# Patient Record
Sex: MALE | URBAN - METROPOLITAN AREA
[De-identification: names, ages, dates, MRNs, and addresses within clinical notes are randomized per-mention and may not be internally consistent; named-entity substitution may affect disease eponyms.]

---

## 2019-09-07 ENCOUNTER — EMERGENCY (EMERGENCY)
Facility: HOSPITAL | Age: 44
LOS: 1 days | Discharge: HOME | End: 2019-09-07
Attending: EMERGENCY MEDICINE | Admitting: EMERGENCY MEDICINE
Payer: COMMERCIAL

## 2019-09-07 VITALS
SYSTOLIC BLOOD PRESSURE: 117 MMHG | OXYGEN SATURATION: 100 % | RESPIRATION RATE: 16 BRPM | TEMPERATURE: 98 F | DIASTOLIC BLOOD PRESSURE: 73 MMHG | HEART RATE: 67 BPM

## 2019-09-07 VITALS
DIASTOLIC BLOOD PRESSURE: 89 MMHG | OXYGEN SATURATION: 100 % | HEART RATE: 91 BPM | TEMPERATURE: 98 F | RESPIRATION RATE: 16 BRPM | SYSTOLIC BLOOD PRESSURE: 130 MMHG

## 2019-09-07 LAB
ALBUMIN SERPL ELPH-MCNC: 4.8 G/DL — SIGNIFICANT CHANGE UP (ref 3.3–5)
ALP SERPL-CCNC: 51 U/L — SIGNIFICANT CHANGE UP (ref 40–120)
ALT FLD-CCNC: 21 U/L — SIGNIFICANT CHANGE UP (ref 4–41)
ANION GAP SERPL CALC-SCNC: 15 MMO/L — HIGH (ref 7–14)
AST SERPL-CCNC: 24 U/L — SIGNIFICANT CHANGE UP (ref 4–40)
BASOPHILS # BLD AUTO: 0.08 K/UL — SIGNIFICANT CHANGE UP (ref 0–0.2)
BASOPHILS NFR BLD AUTO: 0.4 % — SIGNIFICANT CHANGE UP (ref 0–2)
BILIRUB SERPL-MCNC: 0.5 MG/DL — SIGNIFICANT CHANGE UP (ref 0.2–1.2)
BUN SERPL-MCNC: 9 MG/DL — SIGNIFICANT CHANGE UP (ref 7–23)
CALCIUM SERPL-MCNC: 9.5 MG/DL — SIGNIFICANT CHANGE UP (ref 8.4–10.5)
CHLORIDE SERPL-SCNC: 101 MMOL/L — SIGNIFICANT CHANGE UP (ref 98–107)
CO2 SERPL-SCNC: 27 MMOL/L — SIGNIFICANT CHANGE UP (ref 22–31)
CREAT SERPL-MCNC: 0.8 MG/DL — SIGNIFICANT CHANGE UP (ref 0.5–1.3)
EOSINOPHIL # BLD AUTO: 0.02 K/UL — SIGNIFICANT CHANGE UP (ref 0–0.5)
EOSINOPHIL NFR BLD AUTO: 0.1 % — SIGNIFICANT CHANGE UP (ref 0–6)
GLUCOSE SERPL-MCNC: 106 MG/DL — HIGH (ref 70–99)
HCT VFR BLD CALC: 45.5 % — SIGNIFICANT CHANGE UP (ref 39–50)
HGB BLD-MCNC: 15.3 G/DL — SIGNIFICANT CHANGE UP (ref 13–17)
IMM GRANULOCYTES NFR BLD AUTO: 0.5 % — SIGNIFICANT CHANGE UP (ref 0–1.5)
LYMPHOCYTES # BLD AUTO: 1.49 K/UL — SIGNIFICANT CHANGE UP (ref 1–3.3)
LYMPHOCYTES # BLD AUTO: 7.8 % — LOW (ref 13–44)
MCHC RBC-ENTMCNC: 30.8 PG — SIGNIFICANT CHANGE UP (ref 27–34)
MCHC RBC-ENTMCNC: 33.6 % — SIGNIFICANT CHANGE UP (ref 32–36)
MCV RBC AUTO: 91.5 FL — SIGNIFICANT CHANGE UP (ref 80–100)
MONOCYTES # BLD AUTO: 0.94 K/UL — HIGH (ref 0–0.9)
MONOCYTES NFR BLD AUTO: 4.9 % — SIGNIFICANT CHANGE UP (ref 2–14)
NEUTROPHILS # BLD AUTO: 16.5 K/UL — HIGH (ref 1.8–7.4)
NEUTROPHILS NFR BLD AUTO: 86.3 % — HIGH (ref 43–77)
NRBC # FLD: 0 K/UL — SIGNIFICANT CHANGE UP (ref 0–0)
PLATELET # BLD AUTO: 200 K/UL — SIGNIFICANT CHANGE UP (ref 150–400)
PMV BLD: 11.3 FL — SIGNIFICANT CHANGE UP (ref 7–13)
POTASSIUM SERPL-MCNC: 4 MMOL/L — SIGNIFICANT CHANGE UP (ref 3.5–5.3)
POTASSIUM SERPL-SCNC: 4 MMOL/L — SIGNIFICANT CHANGE UP (ref 3.5–5.3)
PROT SERPL-MCNC: 7.3 G/DL — SIGNIFICANT CHANGE UP (ref 6–8.3)
RBC # BLD: 4.97 M/UL — SIGNIFICANT CHANGE UP (ref 4.2–5.8)
RBC # FLD: 13.2 % — SIGNIFICANT CHANGE UP (ref 10.3–14.5)
SODIUM SERPL-SCNC: 143 MMOL/L — SIGNIFICANT CHANGE UP (ref 135–145)
WBC # BLD: 19.12 K/UL — HIGH (ref 3.8–10.5)
WBC # FLD AUTO: 19.12 K/UL — HIGH (ref 3.8–10.5)

## 2019-09-07 PROCEDURE — 23650 CLTX SHO DSLC W/MNPJ WO ANES: CPT | Mod: RT

## 2019-09-07 PROCEDURE — 73030 X-RAY EXAM OF SHOULDER: CPT | Mod: 26,RT

## 2019-09-07 PROCEDURE — 99285 EMERGENCY DEPT VISIT HI MDM: CPT | Mod: 25

## 2019-09-07 RX ORDER — DIAZEPAM 5 MG
5 TABLET ORAL ONCE
Refills: 0 | Status: DISCONTINUED | OUTPATIENT
Start: 2019-09-07 | End: 2019-09-07

## 2019-09-07 RX ORDER — PROPOFOL 10 MG/ML
100 INJECTION, EMULSION INTRAVENOUS ONCE
Refills: 0 | Status: COMPLETED | OUTPATIENT
Start: 2019-09-07 | End: 2019-09-07

## 2019-09-07 RX ORDER — ACETAMINOPHEN 500 MG
975 TABLET ORAL ONCE
Refills: 0 | Status: COMPLETED | OUTPATIENT
Start: 2019-09-07 | End: 2019-09-07

## 2019-09-07 RX ORDER — IBUPROFEN 200 MG
400 TABLET ORAL ONCE
Refills: 0 | Status: COMPLETED | OUTPATIENT
Start: 2019-09-07 | End: 2019-09-07

## 2019-09-07 RX ORDER — HYDROMORPHONE HYDROCHLORIDE 2 MG/ML
0.5 INJECTION INTRAMUSCULAR; INTRAVENOUS; SUBCUTANEOUS ONCE
Refills: 0 | Status: DISCONTINUED | OUTPATIENT
Start: 2019-09-07 | End: 2019-09-07

## 2019-09-07 RX ORDER — MORPHINE SULFATE 50 MG/1
4 CAPSULE, EXTENDED RELEASE ORAL ONCE
Refills: 0 | Status: DISCONTINUED | OUTPATIENT
Start: 2019-09-07 | End: 2019-09-07

## 2019-09-07 RX ORDER — TETANUS TOXOID, REDUCED DIPHTHERIA TOXOID AND ACELLULAR PERTUSSIS VACCINE, ADSORBED 5; 2.5; 8; 8; 2.5 [IU]/.5ML; [IU]/.5ML; UG/.5ML; UG/.5ML; UG/.5ML
0.5 SUSPENSION INTRAMUSCULAR ONCE
Refills: 0 | Status: COMPLETED | OUTPATIENT
Start: 2019-09-07 | End: 2019-09-07

## 2019-09-07 RX ORDER — LIDOCAINE HCL 20 MG/ML
10 VIAL (ML) INJECTION ONCE
Refills: 0 | Status: COMPLETED | OUTPATIENT
Start: 2019-09-07 | End: 2019-09-07

## 2019-09-07 RX ADMIN — Medication 975 MILLIGRAM(S): at 03:06

## 2019-09-07 RX ADMIN — Medication 10 MILLILITER(S): at 03:44

## 2019-09-07 RX ADMIN — Medication 400 MILLIGRAM(S): at 03:06

## 2019-09-07 RX ADMIN — Medication 1 TABLET(S): at 04:18

## 2019-09-07 RX ADMIN — HYDROMORPHONE HYDROCHLORIDE 0.5 MILLIGRAM(S): 2 INJECTION INTRAMUSCULAR; INTRAVENOUS; SUBCUTANEOUS at 03:42

## 2019-09-07 RX ADMIN — PROPOFOL 100 MILLIGRAM(S): 10 INJECTION, EMULSION INTRAVENOUS at 05:05

## 2019-09-07 RX ADMIN — MORPHINE SULFATE 4 MILLIGRAM(S): 50 CAPSULE, EXTENDED RELEASE ORAL at 04:52

## 2019-09-07 RX ADMIN — TETANUS TOXOID, REDUCED DIPHTHERIA TOXOID AND ACELLULAR PERTUSSIS VACCINE, ADSORBED 0.5 MILLILITER(S): 5; 2.5; 8; 8; 2.5 SUSPENSION INTRAMUSCULAR at 03:06

## 2019-09-07 RX ADMIN — Medication 5 MILLIGRAM(S): at 04:18

## 2019-09-07 NOTE — ED PROVIDER NOTE - PHYSICAL EXAMINATION
NCAT EOMI PERRL  Neck supple, no midline tenderness  R shoulder deformity, distal RUE neurovascularly intact in m/u/r distribution, normal radial pulse, normal cap refill

## 2019-09-07 NOTE — ED ADULT NURSE REASSESSMENT NOTE - NS ED NURSE REASSESS COMMENT FT1
Float RN: Pt s/p conscious sedation for right shoulder reduction, pt awake, alert, ambulatory with steady gait at this time. Pt tolerate PO intake, drinking water with ease. Pt in NAD. Resps even/unlabored b/l. Pt denies any pain, states "the shoulder is just sore." Wife at bedside. Primary RN Trupti aware. Gina RN: Pt s/p conscious sedation for right shoulder reduction, see conscious sedation flowsheet. Pt awake, alert, ambulatory with steady gait at this time. Pt tolerate PO intake, drinking water with ease. Pt in NAD. Resps even/unlabored b/l. Pt denies any pain, states "the shoulder is just sore." Wife at bedside. Primary RN Trupti aware.
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
## 2019-09-07 NOTE — ED PROVIDER NOTE - PROGRESS NOTE DETAILS
Lb VILLAVICENCIO: Pt is feeling better, shoulder reduced.  Pt is awake and alert post-conscious sedation prototcol.  He is ambulatory with steady gait, tolerating PO.  Wife is at bedside, stable for dc home.
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
## 2019-09-07 NOTE — ED PROVIDER NOTE - NSFOLLOWUPCLINICS_GEN_ALL_ED_FT
[facility]  .  [place]   Phone: [phone]  Fax:   Follow Up Time:
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
## 2019-09-07 NOTE — ED PROVIDER NOTE - PATIENT PORTAL LINK FT
You can access the FollowMyHealth Patient Portal offered by [facility] by registering at the following website: [email or web address]. By joining [employer]’s FollowMyHealth portal, you will also be able to view your health information using other applications (apps) compatible with our system.
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
## 2019-09-08 NOTE — ED POST DISCHARGE NOTE - REASON FOR FOLLOW-UP
Other Patient contact # [phone] and  both lines " mailbox full and cannot accept Msg's". Called to see how patient feeling post procedural sedation but cannot leave Msg on both phone #'s listed.

## 2023-03-21 NOTE — ED PROVIDER NOTE - CROS ED ROS STATEMENT
I will STOP taking the medications listed below when I get home from the hospital:  None all other ROS negative except as per HPI

## 2024-09-11 ENCOUNTER — INPATIENT (INPATIENT)
Facility: HOSPITAL | Age: 49
LOS: 5 days | Discharge: HOME | End: 2024-09-17
Attending: GENERAL ACUTE CARE HOSPITAL | Admitting: GENERAL ACUTE CARE HOSPITAL
Payer: COMMERCIAL

## 2024-09-11 VITALS
OXYGEN SATURATION: 99 % | TEMPERATURE: 98 F | DIASTOLIC BLOOD PRESSURE: 72 MMHG | HEART RATE: 77 BPM | SYSTOLIC BLOOD PRESSURE: 146 MMHG | WEIGHT: 220.02 LBS | RESPIRATION RATE: 20 BRPM | HEIGHT: 72 IN

## 2024-09-11 LAB
ADD ON TEST-SPECIMEN IN LAB: SIGNIFICANT CHANGE UP
ALBUMIN SERPL ELPH-MCNC: 5.1 G/DL — HIGH (ref 3.3–5)
ALP SERPL-CCNC: 82 U/L — SIGNIFICANT CHANGE UP (ref 40–120)
ALT FLD-CCNC: 21 U/L — SIGNIFICANT CHANGE UP (ref 4–41)
ANION GAP SERPL CALC-SCNC: 21 MMOL/L — HIGH (ref 7–14)
APTT BLD: 32.6 SEC — SIGNIFICANT CHANGE UP (ref 24.5–35.6)
AST SERPL-CCNC: 21 U/L — SIGNIFICANT CHANGE UP (ref 4–40)
BASOPHILS # BLD AUTO: 0.05 K/UL — SIGNIFICANT CHANGE UP (ref 0–0.2)
BASOPHILS NFR BLD AUTO: 0.3 % — SIGNIFICANT CHANGE UP (ref 0–2)
BILIRUB SERPL-MCNC: 1 MG/DL — SIGNIFICANT CHANGE UP (ref 0.2–1.2)
BUN SERPL-MCNC: 8 MG/DL — SIGNIFICANT CHANGE UP (ref 7–23)
CALCIUM SERPL-MCNC: 10.1 MG/DL — SIGNIFICANT CHANGE UP (ref 8.4–10.5)
CHLORIDE SERPL-SCNC: 102 MMOL/L — SIGNIFICANT CHANGE UP (ref 98–107)
CHOLEST SERPL-MCNC: 216 MG/DL — HIGH
CO2 SERPL-SCNC: 18 MMOL/L — LOW (ref 22–31)
CREAT SERPL-MCNC: 0.9 MG/DL — SIGNIFICANT CHANGE UP (ref 0.5–1.3)
EGFR: 105 ML/MIN/1.73M2 — SIGNIFICANT CHANGE UP
EOSINOPHIL # BLD AUTO: 0.05 K/UL — SIGNIFICANT CHANGE UP (ref 0–0.5)
EOSINOPHIL NFR BLD AUTO: 0.3 % — SIGNIFICANT CHANGE UP (ref 0–6)
GLUCOSE SERPL-MCNC: 96 MG/DL — SIGNIFICANT CHANGE UP (ref 70–99)
HCT VFR BLD CALC: 52.8 % — HIGH (ref 39–50)
HDLC SERPL-MCNC: 49 MG/DL — SIGNIFICANT CHANGE UP
HGB BLD-MCNC: 18.3 G/DL — HIGH (ref 13–17)
IANC: 16.85 K/UL — HIGH (ref 1.8–7.4)
IMM GRANULOCYTES NFR BLD AUTO: 0.5 % — SIGNIFICANT CHANGE UP (ref 0–0.9)
INR BLD: 1.06 RATIO — SIGNIFICANT CHANGE UP (ref 0.85–1.18)
LIDOCAIN IGE QN: >3000 U/L — HIGH (ref 7–60)
LIPID PNL WITH DIRECT LDL SERPL: 151 MG/DL — HIGH
LYMPHOCYTES # BLD AUTO: 1.54 K/UL — SIGNIFICANT CHANGE UP (ref 1–3.3)
LYMPHOCYTES # BLD AUTO: 7.8 % — LOW (ref 13–44)
MCHC RBC-ENTMCNC: 31.3 PG — SIGNIFICANT CHANGE UP (ref 27–34)
MCHC RBC-ENTMCNC: 34.7 GM/DL — SIGNIFICANT CHANGE UP (ref 32–36)
MCV RBC AUTO: 90.4 FL — SIGNIFICANT CHANGE UP (ref 80–100)
MONOCYTES # BLD AUTO: 1.09 K/UL — HIGH (ref 0–0.9)
MONOCYTES NFR BLD AUTO: 5.5 % — SIGNIFICANT CHANGE UP (ref 2–14)
NEUTROPHILS # BLD AUTO: 16.85 K/UL — HIGH (ref 1.8–7.4)
NEUTROPHILS NFR BLD AUTO: 85.6 % — HIGH (ref 43–77)
NON HDL CHOLESTEROL: 167 MG/DL — HIGH
NRBC # BLD: 0 /100 WBCS — SIGNIFICANT CHANGE UP (ref 0–0)
NRBC # FLD: 0 K/UL — SIGNIFICANT CHANGE UP (ref 0–0)
PLATELET # BLD AUTO: 274 K/UL — SIGNIFICANT CHANGE UP (ref 150–400)
POTASSIUM SERPL-MCNC: 4 MMOL/L — SIGNIFICANT CHANGE UP (ref 3.5–5.3)
POTASSIUM SERPL-SCNC: 4 MMOL/L — SIGNIFICANT CHANGE UP (ref 3.5–5.3)
PROT SERPL-MCNC: 8.5 G/DL — HIGH (ref 6–8.3)
PROTHROM AB SERPL-ACNC: 11.8 SEC — SIGNIFICANT CHANGE UP (ref 9.5–13)
RBC # BLD: 5.84 M/UL — HIGH (ref 4.2–5.8)
RBC # FLD: 13 % — SIGNIFICANT CHANGE UP (ref 10.3–14.5)
SODIUM SERPL-SCNC: 141 MMOL/L — SIGNIFICANT CHANGE UP (ref 135–145)
TRIGL SERPL-MCNC: 81 MG/DL — SIGNIFICANT CHANGE UP
TROPONIN T, HIGH SENSITIVITY RESULT: <6 NG/L — SIGNIFICANT CHANGE UP
WBC # BLD: 19.68 K/UL — HIGH (ref 3.8–10.5)
WBC # FLD AUTO: 19.68 K/UL — HIGH (ref 3.8–10.5)

## 2024-09-11 PROCEDURE — 74174 CTA ABD&PLVS W/CONTRAST: CPT | Mod: 26,MC

## 2024-09-11 PROCEDURE — 99285 EMERGENCY DEPT VISIT HI MDM: CPT

## 2024-09-11 PROCEDURE — 71275 CT ANGIOGRAPHY CHEST: CPT | Mod: 26,MC

## 2024-09-11 RX ORDER — FAMOTIDINE 10 MG/ML
20 INJECTION INTRAVENOUS ONCE
Refills: 0 | Status: COMPLETED | OUTPATIENT
Start: 2024-09-11 | End: 2024-09-11

## 2024-09-11 RX ORDER — ONDANSETRON 2 MG/ML
4 INJECTION, SOLUTION INTRAMUSCULAR; INTRAVENOUS ONCE
Refills: 0 | Status: COMPLETED | OUTPATIENT
Start: 2024-09-11 | End: 2024-09-11

## 2024-09-11 RX ORDER — PIPERACILLIN SODIUM AND TAZOBACTAM SODIUM 3; .375 G/15ML; G/15ML
3.38 INJECTION, POWDER, FOR SOLUTION INTRAVENOUS ONCE
Refills: 0 | Status: COMPLETED | OUTPATIENT
Start: 2024-09-11 | End: 2024-09-11

## 2024-09-11 RX ORDER — SODIUM CHLORIDE 9 MG/ML
1000 INJECTION INTRAMUSCULAR; INTRAVENOUS; SUBCUTANEOUS ONCE
Refills: 0 | Status: COMPLETED | OUTPATIENT
Start: 2024-09-11 | End: 2024-09-11

## 2024-09-11 RX ADMIN — ONDANSETRON 4 MILLIGRAM(S): 2 INJECTION, SOLUTION INTRAMUSCULAR; INTRAVENOUS at 20:41

## 2024-09-11 RX ADMIN — PIPERACILLIN SODIUM AND TAZOBACTAM SODIUM 200 GRAM(S): 3; .375 INJECTION, POWDER, FOR SOLUTION INTRAVENOUS at 23:49

## 2024-09-11 RX ADMIN — FAMOTIDINE 20 MILLIGRAM(S): 10 INJECTION INTRAVENOUS at 21:36

## 2024-09-11 RX ADMIN — Medication 4 MILLIGRAM(S): at 20:26

## 2024-09-11 RX ADMIN — SODIUM CHLORIDE 1000 MILLILITER(S): 9 INJECTION INTRAMUSCULAR; INTRAVENOUS; SUBCUTANEOUS at 20:42

## 2024-09-11 RX ADMIN — Medication 4 MILLIGRAM(S): at 22:29

## 2024-09-11 NOTE — ED PROVIDER NOTE - PHYSICAL EXAMINATION
Physical exam    Toxic appearing, extremely diaphoretic  Normotensive, pulses equal bilaterally  Clear to auscultation bilaterally  S1-S2 no murmurs rubs or gallops  Abdomen midepigastric pain, no rebound, no guarding   to upper back  Neuro cranial nerves intact, 5/5 motor upper and lower extremity, extremities no edema

## 2024-09-11 NOTE — ED PROVIDER NOTE - OBJECTIVE STATEMENT
49-year-old male with history of gastritis, hypertension presents with chest pain/abdominal pain/back pain that started abruptly at 3 AM last night.  Patient works as a  states was seated in his car when he felt this pain.  Patient actively vomiting in room.  Patient extremely diaphoretic.  Patient states does feel mild chest pain and shortness of breath.  Denies any neurological symptoms.  States does have a history of gastritis and has had possibly similar episodes in the past which are nowhere close to do severity and longevity of the pain today.  Patient states last meal was last night.  Denies any history of gallbladder etiology.

## 2024-09-11 NOTE — ED ADULT NURSE REASSESSMENT NOTE - NS ED NURSE REASSESS COMMENT FT1
Pt received to SUZY, GENE&Ox4, ambulatory. Respirations even and unlabored. NSR on cardiac monitor. Pt endorsing back pain and nausea. Pt denies c/p, SOB, n/v/d, abd pain, blurry vision, headache, or fever like symptoms. Comfort measures provided, safety maintained. Plan of care ongoing.

## 2024-09-11 NOTE — ED ADULT NURSE NOTE - NSFALLUNIVINTERV_ED_ALL_ED
Bed/Stretcher in lowest position, wheels locked, appropriate side rails in place/Call bell, personal items and telephone in reach/Instruct patient to call for assistance before getting out of bed/chair/stretcher/Non-slip footwear applied when patient is off stretcher/[place] to call system/Physically safe environment - no spills, clutter or unnecessary equipment/Purposeful proactive rounding/Room/bathroom lighting operational, light cord in reach

## 2024-09-11 NOTE — ED ADULT NURSE NOTE - OBJECTIVE STATEMENT
Pt arrives to intake room 16 A&ox4, ambulatory, on room air coming to ED c/o abdominal pain and chest pain. Pt history of HTN, gastritis. Pt states pain began at 3 AM 9/11. Pt diaphoretic, endorsing SOB and chest pain at this time. O2 100% on room air, respirations even and unlabored, chest rise and fall equal b/l. MD Meza at bedside. B/l 20g placed, labs collected and sent. Pt transferred to [facility] ED, report given to SHRUTHI Saldana. Pt placed on cardiac monitor, NSR. Pt pending CT. Pt safety maintained.

## 2024-09-11 NOTE — ED ADULT TRIAGE NOTE - CHIEF COMPLAINT QUOTE
patient states" I have severe abdominal pain and chest tightness and back pain since 3 am " h/o acid reflux, HTN, Anxiety, takes Lexapro, Amlodipine. c/o nausea , vomiting c/o severe pain with abdominal distention and chest tightness.

## 2024-09-11 NOTE — ED PROVIDER NOTE - CLINICAL SUMMARY MEDICAL DECISION MAKING FREE TEXT BOX
EKG nonspecific ST changes unchanged from prior  Impression  Differential includes gastritis versus biliary pathology versus aortic dissection versus kidney stone  Given patient's appearance at present will upgraded to main emergency room, have endorsed to Dr. Thomas  Will send labs including troponin, UA start patient on fluid and morphine put in for CTA chest abdomen pelvis to rule out dissection

## 2024-09-11 NOTE — ED PROVIDER NOTE - PROGRESS NOTE DETAILS
Erendira Lugo, PGY-3: pt with pancreatitis. no stones noted on CT.   Patient follows w Dr. Stewart. TBA Dr. Mccauley

## 2024-09-12 DIAGNOSIS — I10 ESSENTIAL (PRIMARY) HYPERTENSION: ICD-10-CM

## 2024-09-12 DIAGNOSIS — K29.70 GASTRITIS, UNSPECIFIED, WITHOUT BLEEDING: ICD-10-CM

## 2024-09-12 DIAGNOSIS — K85.90 ACUTE PANCREATITIS WITHOUT NECROSIS OR INFECTION, UNSPECIFIED: ICD-10-CM

## 2024-09-12 DIAGNOSIS — Z29.9 ENCOUNTER FOR PROPHYLACTIC MEASURES, UNSPECIFIED: ICD-10-CM

## 2024-09-12 LAB
ALBUMIN SERPL ELPH-MCNC: 3.9 G/DL — SIGNIFICANT CHANGE UP (ref 3.3–5)
ALP SERPL-CCNC: 68 U/L — SIGNIFICANT CHANGE UP (ref 40–120)
ALT FLD-CCNC: 30 U/L — SIGNIFICANT CHANGE UP (ref 4–41)
ANION GAP SERPL CALC-SCNC: 14 MMOL/L — SIGNIFICANT CHANGE UP (ref 7–14)
AST SERPL-CCNC: 17 U/L — SIGNIFICANT CHANGE UP (ref 4–40)
BILIRUB DIRECT SERPL-MCNC: <0.2 MG/DL — SIGNIFICANT CHANGE UP (ref 0–0.3)
BILIRUB INDIRECT FLD-MCNC: >0.7 MG/DL — SIGNIFICANT CHANGE UP (ref 0–1)
BILIRUB SERPL-MCNC: 0.9 MG/DL — SIGNIFICANT CHANGE UP (ref 0.2–1.2)
BUN SERPL-MCNC: 7 MG/DL — SIGNIFICANT CHANGE UP (ref 7–23)
CALCIUM SERPL-MCNC: 8.6 MG/DL — SIGNIFICANT CHANGE UP (ref 8.4–10.5)
CHLORIDE SERPL-SCNC: 102 MMOL/L — SIGNIFICANT CHANGE UP (ref 98–107)
CO2 SERPL-SCNC: 20 MMOL/L — LOW (ref 22–31)
CREAT SERPL-MCNC: 0.73 MG/DL — SIGNIFICANT CHANGE UP (ref 0.5–1.3)
EGFR: 112 ML/MIN/1.73M2 — SIGNIFICANT CHANGE UP
GLUCOSE SERPL-MCNC: 91 MG/DL — SIGNIFICANT CHANGE UP (ref 70–99)
HCT VFR BLD CALC: 39.8 % — SIGNIFICANT CHANGE UP (ref 39–50)
HGB BLD-MCNC: 13.2 G/DL — SIGNIFICANT CHANGE UP (ref 13–17)
MAGNESIUM SERPL-MCNC: 1.9 MG/DL — SIGNIFICANT CHANGE UP (ref 1.6–2.6)
MCHC RBC-ENTMCNC: 27.5 PG — SIGNIFICANT CHANGE UP (ref 27–34)
MCHC RBC-ENTMCNC: 33.2 GM/DL — SIGNIFICANT CHANGE UP (ref 32–36)
MCV RBC AUTO: 82.9 FL — SIGNIFICANT CHANGE UP (ref 80–100)
NRBC # BLD: 0 /100 WBCS — SIGNIFICANT CHANGE UP (ref 0–0)
NRBC # FLD: 0 K/UL — SIGNIFICANT CHANGE UP (ref 0–0)
PHOSPHATE SERPL-MCNC: 2.7 MG/DL — SIGNIFICANT CHANGE UP (ref 2.5–4.5)
PLATELET # BLD AUTO: 259 K/UL — SIGNIFICANT CHANGE UP (ref 150–400)
POTASSIUM SERPL-MCNC: 3.6 MMOL/L — SIGNIFICANT CHANGE UP (ref 3.5–5.3)
POTASSIUM SERPL-SCNC: 3.6 MMOL/L — SIGNIFICANT CHANGE UP (ref 3.5–5.3)
PROT SERPL-MCNC: 6.9 G/DL — SIGNIFICANT CHANGE UP (ref 6–8.3)
RBC # BLD: 4.8 M/UL — SIGNIFICANT CHANGE UP (ref 4.2–5.8)
RBC # FLD: 13.2 % — SIGNIFICANT CHANGE UP (ref 10.3–14.5)
SODIUM SERPL-SCNC: 136 MMOL/L — SIGNIFICANT CHANGE UP (ref 135–145)
WBC # BLD: 9.79 K/UL — SIGNIFICANT CHANGE UP (ref 3.8–10.5)
WBC # FLD AUTO: 9.79 K/UL — SIGNIFICANT CHANGE UP (ref 3.8–10.5)

## 2024-09-12 PROCEDURE — 99222 1ST HOSP IP/OBS MODERATE 55: CPT

## 2024-09-12 PROCEDURE — 99223 1ST HOSP IP/OBS HIGH 75: CPT

## 2024-09-12 RX ORDER — HYDROMORPHONE HYDROCHLORIDE 2 MG/1
0.5 TABLET ORAL ONCE
Refills: 0 | Status: DISCONTINUED | OUTPATIENT
Start: 2024-09-12 | End: 2024-09-12

## 2024-09-12 RX ORDER — HYDROMORPHONE HYDROCHLORIDE 2 MG/1
2 TABLET ORAL EVERY 4 HOURS
Refills: 0 | Status: DISCONTINUED | OUTPATIENT
Start: 2024-09-12 | End: 2024-09-13

## 2024-09-12 RX ORDER — ENOXAPARIN SODIUM 100 MG/ML
40 INJECTION SUBCUTANEOUS EVERY 24 HOURS
Refills: 0 | Status: DISCONTINUED | OUTPATIENT
Start: 2024-09-12 | End: 2024-09-17

## 2024-09-12 RX ORDER — ONDANSETRON 2 MG/ML
4 INJECTION, SOLUTION INTRAMUSCULAR; INTRAVENOUS EVERY 8 HOURS
Refills: 0 | Status: DISCONTINUED | OUTPATIENT
Start: 2024-09-12 | End: 2024-09-17

## 2024-09-12 RX ORDER — KETOROLAC TROMETHAMINE 30 MG/ML
15 INJECTION, SOLUTION INTRAMUSCULAR EVERY 6 HOURS
Refills: 0 | Status: DISCONTINUED | OUTPATIENT
Start: 2024-09-12 | End: 2024-09-13

## 2024-09-12 RX ORDER — MAGNESIUM, ALUMINUM HYDROXIDE 200-225/5
30 SUSPENSION, ORAL (FINAL DOSE FORM) ORAL EVERY 4 HOURS
Refills: 0 | Status: DISCONTINUED | OUTPATIENT
Start: 2024-09-12 | End: 2024-09-17

## 2024-09-12 RX ORDER — AMLODIPINE BESYLATE 10 MG/1
5 TABLET ORAL DAILY
Refills: 0 | Status: DISCONTINUED | OUTPATIENT
Start: 2024-09-12 | End: 2024-09-17

## 2024-09-12 RX ORDER — ACETAMINOPHEN 325 MG/1
1000 TABLET ORAL EVERY 6 HOURS
Refills: 0 | Status: DISCONTINUED | OUTPATIENT
Start: 2024-09-12 | End: 2024-09-12

## 2024-09-12 RX ORDER — ESCITALOPRAM OXALATE 10 MG/1
20 TABLET ORAL DAILY
Refills: 0 | Status: DISCONTINUED | OUTPATIENT
Start: 2024-09-12 | End: 2024-09-17

## 2024-09-12 RX ADMIN — HYDROMORPHONE HYDROCHLORIDE 0.5 MILLIGRAM(S): 2 TABLET ORAL at 17:49

## 2024-09-12 RX ADMIN — Medication 150 MILLILITER(S): at 02:39

## 2024-09-12 RX ADMIN — HYDROMORPHONE HYDROCHLORIDE 0.5 MILLIGRAM(S): 2 TABLET ORAL at 03:36

## 2024-09-12 RX ADMIN — ESCITALOPRAM OXALATE 20 MILLIGRAM(S): 10 TABLET ORAL at 12:25

## 2024-09-12 RX ADMIN — Medication 30 MILLILITER(S): at 14:46

## 2024-09-12 RX ADMIN — Medication 4 MILLIGRAM(S): at 00:30

## 2024-09-12 RX ADMIN — HYDROMORPHONE HYDROCHLORIDE 2 MILLIGRAM(S): 2 TABLET ORAL at 06:45

## 2024-09-12 RX ADMIN — Medication 30 MILLILITER(S): at 09:56

## 2024-09-12 RX ADMIN — KETOROLAC TROMETHAMINE 15 MILLIGRAM(S): 30 INJECTION, SOLUTION INTRAMUSCULAR at 21:33

## 2024-09-12 RX ADMIN — AMLODIPINE BESYLATE 5 MILLIGRAM(S): 10 TABLET ORAL at 12:25

## 2024-09-12 RX ADMIN — ENOXAPARIN SODIUM 40 MILLIGRAM(S): 100 INJECTION SUBCUTANEOUS at 06:44

## 2024-09-12 RX ADMIN — KETOROLAC TROMETHAMINE 15 MILLIGRAM(S): 30 INJECTION, SOLUTION INTRAMUSCULAR at 09:44

## 2024-09-12 RX ADMIN — HYDROMORPHONE HYDROCHLORIDE 2 MILLIGRAM(S): 2 TABLET ORAL at 23:30

## 2024-09-12 RX ADMIN — KETOROLAC TROMETHAMINE 15 MILLIGRAM(S): 30 INJECTION, SOLUTION INTRAMUSCULAR at 14:46

## 2024-09-12 RX ADMIN — KETOROLAC TROMETHAMINE 15 MILLIGRAM(S): 30 INJECTION, SOLUTION INTRAMUSCULAR at 10:30

## 2024-09-12 RX ADMIN — HYDROMORPHONE HYDROCHLORIDE 2 MILLIGRAM(S): 2 TABLET ORAL at 13:36

## 2024-09-12 NOTE — CONSULT NOTE ADULT - SUBJECTIVE AND OBJECTIVE BOX
Chief Complaint:  Patient is a 49y old  Male who presents with a chief complaint of Pancreatitis (12 Sep 2024 01:54)      HPI: 49M PMHx gastritis, HTN who presents with abdominal pain. CTA AP showing diffuse peripancreatic fat stranding/fluid suggesting acute pancreatitis, no peripancreatic collection or evidence of parenchymal necrosis.    GI consulted for pancreatitis. pt seen and examined. wife at bedside. c/o chronic hx of intermittent upper gi symptoms (reflux, belching) but yesterday at 3am with severe epigastric abd pain radiating to the back with associated nausea and non bloody vomiting. last bm yesterday and non bloody. no prior episodes of pancreatitis. no known gb dz, high of hypertriglyceridemia, etoh use, new meds/herbal supplements, or recent viral illnesses. denies f/c, hematemesis, diarrhea, constipation, melena, hematochezia. last egd/colon done 1-2yrs ago by GI at [facility] in [place]- reportedly unremarkable. no prior abd sx. fhx n/c for gi tract cancers. no ac/ap/nsaids. +smoker.     currently avss on ra  initial wbc 19k--> 9k  hgb 18--> 13  cr and lfts wnl, lipase >3k, tg wnl  cta ap as above    Allergies:  No Known Allergies      PMHX/PSHX:      Gastritis    Hypertension    No significant past surgical history      Family history:   as above    Social History: as above    Home Medications: as per h&p    Hospital Medications:  aluminum hydroxide/magnesium hydroxide/simethicone Suspension 30 milliLiter(s) Oral every 4 hours PRN  amLODIPine   Tablet 5 milliGRAM(s) Oral daily  enoxaparin Injectable 40 milliGRAM(s) SubCutaneous every 24 hours  escitalopram 20 milliGRAM(s) Oral daily  HYDROmorphone   Tablet 2 milliGRAM(s) Oral every 4 hours PRN  ketorolac   Injectable 15 milliGRAM(s) IV Push every 6 hours  lactated ringers. 1000 milliLiter(s) IV Continuous <Continuous>  ondansetron Injectable 4 milliGRAM(s) IV Push every 8 hours PRN        Pertinent ROS as per HPI      Vital Signs:  Vital Signs Last 24 Hrs  T(C): 37 (12 Sep 2024 12:26), Max: 37.1 (12 Sep 2024 03:00)  T(F): 98.6 (12 Sep 2024 12:26), Max: 98.8 (12 Sep 2024 03:00)  HR: 65 (12 Sep 2024 13:36) (63 - 77)  BP: 113/84 (12 Sep 2024 13:36) (103/74 - 165/61)  BP(mean): --  RR: 18 (12 Sep 2024 13:36) (16 - 20)  SpO2: 97% (12 Sep 2024 13:36) (97% - 100%)    Parameters below as of 12 Sep 2024 13:36  Patient On (Oxygen Delivery Method): room air      Daily Height in cm: 182.88 (11 Sep 2024 19:01)    Daily     LABS:                        13.2   9.79  )-----------( 259      ( 12 Sep 2024 07:17 )             39.8     Mean Cell Volume: 82.9 fL (09-12-24 @ 07:17)    09-12    136  |  102  |  7   ----------------------------<  91  3.6   |  20<L>  |  0.73    Ca    8.6      12 Sep 2024 07:17  Phos  2.7     09-12  Mg     1.90     09-12    TPro  6.9  /  Alb  3.9  /  TBili  0.9  /  DBili  <0.2  /  AST  17  /  ALT  30  /  AlkPhos  68  09-12    LIVER FUNCTIONS - ( 12 Sep 2024 07:17 )  Alb: 3.9 g/dL / Pro: 6.9 g/dL / ALK PHOS: 68 U/L / ALT: 30 U/L / AST: 17 U/L / GGT: x           PT/INR - ( 11 Sep 2024 20:35 )   PT: 11.8 sec;   INR: 1.06 ratio         PTT - ( 11 Sep 2024 20:35 )  PTT:32.6 sec  Urinalysis Basic - ( 12 Sep 2024 07:17 )    Color: x / Appearance: x / SG: x / pH: x  Gluc: 91 mg/dL / Ketone: x  / Bili: x / Urobili: x   Blood: x / Protein: x / Nitrite: x   Leuk Esterase: x / RBC: x / WBC x   Sq Epi: x / Non Sq Epi: x / Bacteria: x      Amylase Serum--      Lipase serum>3000       Ammonia--                          13.2   9.79  )-----------( 259      ( 12 Sep 2024 07:17 )             39.8                         18.3   19.68 )-----------( 274      ( 11 Sep 2024 20:35 )             52.8       PHYSICAL EXAM:   GENERAL: lying in bed, in no apparent distress  HEENT: NCAT  EYES: anicteric sclerae  NECK: trachea midline, FROM  CHEST:  respirations even, non labored  ABDOMEN:  soft, generalized ttp most prominent epigastric region, nd  EXTREMITIES: WWP, no edema  SKIN:  warm/dry, no visible rash appreciated  PSYCH: appropriate affect, A&O x 3  NEURO: no tremor noted     Imaging:    ACC: [id number] EXAM:  CT ANGIO CHEST AORTA WAWIC   ORDERED BY: DAMARI MART     ACC: [id number] EXAM:  CT ANGIO ABD PELV (W)AW IC   ORDERED BY: DAMARI MART     PROCEDURE DATE:  09/11/2024          INTERPRETATION:  CLINICAL INFORMATION: Mid epigastric pain radiating to   the back, assess for aortic dissection.    COMPARISON: None.    CONTRAST/COMPLICATIONS:  IV Contrast: IV contrast documented in unlinked concurrent exam   (accession [id number]), Omnipaque 350 (accession [id number])  90 cc   administered   10 cc discarded  Oral Contrast: NONE  Complications: None reported at time of study completion    PROCEDURE:  CT Angiography of the Chest, Abdomen and Pelvis.  Precontrast imaging was performed through the chest followed by arterial   phase imaging of the chest, abdomen and pelvis.  Sagittal and coronal reformats were performed as well as 3D (MIP)   reconstructions.    FINDINGS:  CHEST:  LUNGS AND LARGE AIRWAYS: Patent central airways. No focal airspace   consolidation.  PLEURA: No pneumothorax or pleural effusion.  VESSELS: No intramural hematoma, penetrating ulcer, or dissection.   Aberrant right subclavian artery, coursing posterior to the esophagus,   normal anatomic variant. No evidence of PE. Main pulmonary artery   measures 3.7 cm in diameter suggesting pulmonary hypertension.  HEART: Heart size is normal. No pericardial effusion.  MEDIASTINUM AND RAUL: No lymphadenopathy.  CHEST WALL AND LOWER NECK: Within normal limits.    ABDOMEN AND PELVIS:  LIVER: Within normal limits.  BILE DUCTS: Normal caliber.  GALLBLADDER: Within normal limits.  SPLEEN: Within normal limits.  PANCREAS: There is diffuse peripancreatic fat stranding/fluid suggesting   acute pancreatitis. No peripancreatic collection. No evidence of   parenchymal necrosis.  ADRENALS: Within normal limits.  KIDNEYS/URETERS: No renal stones, renal masses or evidence of a   urothelial lesion.    BLADDER: Within normal limits.  REPRODUCTIVE ORGANS: Prostate is mildly enlarged.    BOWEL: No bowel obstruction. Appendix is normal in caliber with a   punctate appendicolith seen near the appendiceal tip. No periappendiceal   edema. There is diffuse colonic lipomatosis, nonspecific, although can be   seen in chronic IBD. Mild colonic diverticulosis.  PERITONEUM/RETROPERITONEUM: There is moderate amount of peritoneal free   fluid within the peripancreatic space, tracking along the pararenal   spaces bilaterally. No organized collection.  VESSELS: Mild calcific and noncalcific atheromatous disease involving the   intra-abdominal aorta and the left common iliac artery. Moderate calcific   and noncalcific atheromatous disease involving the right common iliac   artery resulting in mild narrowing. No evidence of intramural hematoma,   penetrating ulcer or aortic dissection. No aneurysmal dilatation. There   is patency of the celiac axis, SMA, bilateral renal artery origins and   TRACEY. There is a replaced right hepatic artery arising from the SMA. Mild   atherosclerotic changes. No evidence of a splenic artery pseudoaneurysm.   Limited evaluation for splenic venous thrombosis secondary to imaging   technique.  LYMPH NODES: No lymphadenopathy.  ABDOMINAL WALL: A small fat-containing left-sided inguinal hernia is   noted.  BONES: Degenerative changes.    IMPRESSION:  No aortic dissection or acute aortic syndrome.    Acute pancreatitis, as above.    Other nonacute findings, as above.    --- End of Report ---

## 2024-09-12 NOTE — H&P ADULT - NSHPPHYSICALEXAM_GEN_ALL_CORE
PHYSICAL EXAM:  GENERAL: NAD  HEAD:  Atraumatic, Normocephalic  EYES: EOMI, PERRL, conjunctiva and sclera clear  NECK: Supple, No JVD  CHEST/LUNG: Clear to auscultation bilaterally; No wheezes, rales or rhonchi; normal work of breathing, speaking in full sentences  HEART: Regular rate and rhythm; No murmurs, rubs, or gallops, (+)S1, S2  ABDOMEN: Soft, Nontender, Nondistended; Normal Bowel sounds   EXTREMITIES:  2+ Peripheral Pulses, No clubbing, cyanosis, or edema  PSYCH: normal mood and affect, A&Ox3  NEUROLOGY: no focal neuro deficits  SKIN: No rashes or lesions PHYSICAL EXAM:  GENERAL: middle aged man, uncomfortable appearing  HEAD:  Atraumatic, Normocephalic  EYES: EOMI, PERRL, conjunctiva and sclera clear  NECK: Supple, No JVD  CHEST/LUNG: Clear to auscultation bilaterally; No wheezes, rales or rhonchi; normal work of breathing, speaking in full sentences  HEART: Regular rate and rhythm; No murmurs, rubs, or gallops, (+)S1, S2  ABDOMEN: Soft, moderate ttp in epigastrum, Nondistended; Normal Bowel sounds   EXTREMITIES:  2+ Peripheral Pulses, No clubbing, cyanosis, or edema  PSYCH: normal mood and affect, A&Ox3  NEUROLOGY: no focal neuro deficits  SKIN: No rashes or lesions

## 2024-09-12 NOTE — H&P ADULT - ASSESSMENT
49M PMHx gastritis, HTN who presents with chest pain/abdominal pain/back pain. 49M PMHx gastritis, HTN who presents with 1 day of abdominal pain found to have acute pancreatitis.

## 2024-09-12 NOTE — CONSULT NOTE ADULT - NS ATTEND AMEND GEN_ALL_CORE FT
50yo M with no risk factor who presents with 1st episode of acute pancreatitis. No clear etiology identified. Would obtain MRI/MRCP to further evaluate for subtle cholelithiasis vs. malignancy. Please make sure patient is on optimal fluid repletion (1.5cc/kg/hr) and encourage early initiation of PO diet if tolerating. 48yo M with no risk factor who presents with 1st episode of acute pancreatitis. No clear etiology identified. No evidence of end organ damage. Would obtain MRI/MRCP to further evaluate for subtle cholelithiasis vs. malignancy. Please make sure patient is on optimal fluid repletion (1.5cc/kg/hr) and encourage early initiation of PO diet if tolerating.

## 2024-09-12 NOTE — H&P ADULT - NSHPREVIEWOFSYSTEMS_GEN_ALL_CORE
REVIEW OF SYSTEMS:    CONSTITUTIONAL: No weakness, fevers or chills  EYES/ENT: No visual changes; No dysphagia; No sore throat; No rhinorrhea; No sinus pain/pressure  NECK: No pain or stiffness  RESPIRATORY: No cough, wheezing, hemoptysis; No shortness of breath  CARDIOVASCULAR: No chest pain or palpitations; No lower extremity edema  GASTROINTESTINAL: No abdominal or epigastric pain. No nausea, vomiting, or hematemesis; No diarrhea or constipation. No melena or hematochezia.  GENITOURINARY: No dysuria, frequency or hematuria  NEUROLOGICAL: No numbness, paresthesias, or weakness; No HA; No LH/dizziness  MSK: ambulates with  SKIN: No itching, burning, rashes, or lesions   All other review of systems is negative unless indicated above. REVIEW OF SYSTEMS:    CONSTITUTIONAL: No weakness, fevers or chills  EYES/ENT: No visual changes; No dysphagia; No sore throat; No rhinorrhea; No sinus pain/pressure  NECK: No pain or stiffness  RESPIRATORY: No cough, wheezing, hemoptysis; (+) shortness of breath  CARDIOVASCULAR: (+) chest pain; No lower extremity edema  GASTROINTESTINAL: (+) epigastric pain. (+) nausea w/ emesis; no hematemesis; No diarrhea or constipation. No melena or hematochezia.  GENITOURINARY: No dysuria, frequency or hematuria  NEUROLOGICAL: No numbness, paresthesias, or weakness; No HA; No LH/dizziness  MSK: ambulates without assist  SKIN: No itching, burning, rashes, or lesions   All other review of systems is negative unless indicated above.

## 2024-09-12 NOTE — H&P ADULT - NSHPLABSRESULTS_GEN_ALL_CORE
18.3   19.68 )-----------( 274      ( 11 Sep 2024 20:35 )             52.8     141  |  102  |  8   ----------------------------<  96     09-11  4.0   |  18<L>  |  0.90    Ca    10.1      11 Sep 2024 20:35    TPro  8.5<H>  /  Alb  5.1<H>  /  TBili  1.0  /  DBili  x   /  AST  21  /  ALT  21  /  AlkPhos  82  09-11    PT/INR: 11.8/1.06 (09-11-24 @ 20:35)  PTT: 32.6 (09-11-24 @ 20:35)      hs Troponin, T - <6 ng/L (09-11-24 @ 20:35)    Lipase > 3000      CTA C/A/P:  IMPRESSION:  No aortic dissection or acute aortic syndrome.    Acute pancreatitis, as above.    Other nonacute findings, as above. 18.3   19.68 )-----------( 274      ( 11 Sep 2024 20:35 )             52.8     141  |  102  |  8   ----------------------------<  96     09-11  4.0   |  18<L>  |  0.90    Ca    10.1      11 Sep 2024 20:35    TPro  8.5<H>  /  Alb  5.1<H>  /  TBili  1.0  /  DBili  x   /  AST  21  /  ALT  21  /  AlkPhos  82  09-11    PT/INR: 11.8/1.06 (09-11-24 @ 20:35)  PTT: 32.6 (09-11-24 @ 20:35)      hs Troponin, T - <6 ng/L (09-11-24 @ 20:35)    Lipase > 3000    EKG: NSR, LAFB, no significant ST segment or T wave abnormalities      CTA C/A/P:  IMPRESSION:  No aortic dissection or acute aortic syndrome.    Acute pancreatitis, as above.    Other nonacute findings, as above.

## 2024-09-12 NOTE — CONSULT NOTE ADULT - ASSESSMENT
49M PMHx gastritis, HTN who presents with abdominal pain. CTA AP showing diffuse peripancreatic fat stranding/fluid suggesting acute pancreatitis, no peripancreatic collection or evidence of parenchymal necrosis.    HDS on RA. Labs: initial wbc 19k--> 9k, hgb 18-->13 sp 1L IVF, cr/lfts/tg wnl, lipase >3k, tg wnl, CTA AP as above    # acute pancreatitis   # acute abdominal pain  - p/w acute epigastric abd pain x 1 day with associated nausea and non bloody vomiting with imaging as above, no gs on CT, no hx of etoh use, tg wnl, no recent viral illnesses or new meds, fhx n/c, unclear etiology- ddx includes gs/sludge, anatomic abnormality, idiopathic, less likely AI  # hx of chronic upper gi complaints sp egd last yr reportedly unrevealing      Recommendations-  - IVF 1.5cc/kg/hr  - early nutrition with clears as able to tolerate  - check igg4 for completion  - ppi qd, maalox prn, anti emetics prn if qtc ok/no med CI  - obtain MRCP to further evaluate for gs/sludge and r/o any anatomical abnormalities  - rest of care as per primary team    dw gi attg    ARMANDO Hernandez PA-C, RD, CDN  available on TEAMS M/T/TH/F from 7am - 4pm    after hours/weekends: non urgent issues --> email [email or web address], urgent issues --> contact on-call GI fellow at [phone]    49M PMHx gastritis, HTN who presents with abdominal pain. CTA AP showing diffuse peripancreatic fat stranding/fluid suggesting acute pancreatitis, no peripancreatic collection or evidence of parenchymal necrosis.    HDS on RA. Labs: initial wbc 19k--> 9k, hgb 18-->13 sp 1L IVF, cr/lfts/tg wnl, lipase >3k, tg wnl, CTA AP as above    # acute pancreatitis   # acute abdominal pain  - p/w acute epigastric abd pain x 1 day with associated nausea and non bloody vomiting with imaging as above, no gs on CT, no hx of etoh use, tg wnl, no recent viral illnesses or new meds, fhx n/c, unclear etiology- ddx includes gs/sludge, anatomic abnormality, idiopathic, malignancy, less likely AI  # hx of chronic upper gi complaints sp egd last yr reportedly unrevealing      Recommendations-  - IVF 1.5cc/kg/hr  - early nutrition with clears as able to tolerate  - check igg4 for completion  - ppi qd, maalox prn, anti emetics prn if qtc ok/no med CI  - obtain MRCP to further evaluate for gs/sludge and r/o any anatomical abnormalities  - rest of care as per primary team    dw gi attg    ARMANDO Hernandez PA-C, RD, CDN  available on TEAMS M/T/TH/F from 7am - 4pm    after hours/weekends: non urgent issues --> email [email or web address], urgent issues --> contact on-call GI fellow at [phone]

## 2024-09-12 NOTE — H&P ADULT - HISTORY OF PRESENT ILLNESS
49M PMHx gastritis, HTN who presents with chest pain/abdominal pain/back pain.    Patient that started abruptly at 3 AM last night.  Patient works as a  states was seated in his car when he felt this pain.  Patient actively vomiting in room.  Patient extremely diaphoretic.  Patient states does feel mild chest pain and shortness of breath.  Denies any neurological symptoms.  States does have a history of gastritis and has had possibly similar episodes in the past which are nowhere close to do severity and longevity of the pain today.  Patient states last meal was last night.  Denies any history of gallbladder disease.    ED Course:  AFVSS.  Labs notable for wbc 20, lipase > 3K, LFTs and TGs wnl.  CTA C/A/P with acute pancreatitis.  Given zosyn and 1L NS. 49M PMHx gastritis, HTN who presents with abdominal pain.    Patient states that at about 3 AM night PTA had abrupt onset abdominal pain radiating to back and chest.  Patient works as a  states was seated in his car when he felt this pain.  Endorses mild shortness of breath a/w pain.  Has not attempted to eat all day due to the pain, and states he has had some nausea w/ 2 episodes of non-bloody emesis.  He does report a history of chronic gastritis characterized by intermittent early satiety and indigestion, however he states his sx today feel different, and are much more severe.  States he follows with GI doctor and has been tried on several antacids in past without resolution of his sx. He denies any alcohol use, history of gallbladder disease, or recent abdominal procedures.      ED Course:  AFVSS.  Labs notable for wbc 20, lipase > 3K, LFTs and TGs wnl.  CTA C/A/P with acute pancreatitis.  Given zosyn x1 and 1L NS.

## 2024-09-12 NOTE — H&P ADULT - PROBLEM SELECTOR PLAN 1
- LR @ 150ccs/hr  - pain control, anti-emetics prn  - advance diet as tolerate Etiology unclear.  No alcohol use, no history of gallstones, no recent abdominal procedures. TGs wnl.  - LR @ 150ccs/hr  - pain control, anti-emetics prn  - NPO for now; advance diet as tolerated  - RUQ to eval for gallstone disease or other signs of extrahepatic biliary obstruction Etiology unclear.  No alcohol use, no history of gallstones, no recent abdominal procedures. TGs wnl.  - LR @ 150ccs/hr  - pain control, anti-emetics prn  - defer further abx, as lower c/f infection currently  - NPO for now; advance diet as tolerated  - RUQ to eval for gallstone disease or other signs of extrahepatic biliary obstruction

## 2024-09-13 LAB
ADD ON TEST-SPECIMEN IN LAB: SIGNIFICANT CHANGE UP
ANION GAP SERPL CALC-SCNC: 14 MMOL/L — SIGNIFICANT CHANGE UP (ref 7–14)
BUN SERPL-MCNC: 10 MG/DL — SIGNIFICANT CHANGE UP (ref 7–23)
CALCIUM SERPL-MCNC: 8.1 MG/DL — LOW (ref 8.4–10.5)
CHLORIDE SERPL-SCNC: 105 MMOL/L — SIGNIFICANT CHANGE UP (ref 98–107)
CO2 SERPL-SCNC: 21 MMOL/L — LOW (ref 22–31)
CREAT SERPL-MCNC: 0.72 MG/DL — SIGNIFICANT CHANGE UP (ref 0.5–1.3)
EGFR: 112 ML/MIN/1.73M2 — SIGNIFICANT CHANGE UP
GLUCOSE SERPL-MCNC: 74 MG/DL — SIGNIFICANT CHANGE UP (ref 70–99)
HCT VFR BLD CALC: 38.9 % — LOW (ref 39–50)
HGB BLD-MCNC: 13.8 G/DL — SIGNIFICANT CHANGE UP (ref 13–17)
MCHC RBC-ENTMCNC: 31.1 PG — SIGNIFICANT CHANGE UP (ref 27–34)
MCHC RBC-ENTMCNC: 35.5 GM/DL — SIGNIFICANT CHANGE UP (ref 32–36)
MCV RBC AUTO: 87.6 FL — SIGNIFICANT CHANGE UP (ref 80–100)
NRBC # BLD: 0 /100 WBCS — SIGNIFICANT CHANGE UP (ref 0–0)
NRBC # FLD: 0 K/UL — SIGNIFICANT CHANGE UP (ref 0–0)
PLATELET # BLD AUTO: 151 K/UL — SIGNIFICANT CHANGE UP (ref 150–400)
POTASSIUM SERPL-MCNC: 3.5 MMOL/L — SIGNIFICANT CHANGE UP (ref 3.5–5.3)
POTASSIUM SERPL-SCNC: 3.5 MMOL/L — SIGNIFICANT CHANGE UP (ref 3.5–5.3)
RBC # BLD: 4.44 M/UL — SIGNIFICANT CHANGE UP (ref 4.2–5.8)
RBC # FLD: 13.1 % — SIGNIFICANT CHANGE UP (ref 10.3–14.5)
SODIUM SERPL-SCNC: 140 MMOL/L — SIGNIFICANT CHANGE UP (ref 135–145)
WBC # BLD: 15.38 K/UL — HIGH (ref 3.8–10.5)
WBC # FLD AUTO: 15.38 K/UL — HIGH (ref 3.8–10.5)

## 2024-09-13 PROCEDURE — 74183 MRI ABD W/O CNTR FLWD CNTR: CPT | Mod: 26

## 2024-09-13 PROCEDURE — 99232 SBSQ HOSP IP/OBS MODERATE 35: CPT

## 2024-09-13 RX ORDER — FLU VACCINE TS 2012-2013(5YR+) 45MCG/.5ML
0.5 VIAL (ML) INTRAMUSCULAR ONCE
Refills: 0 | Status: DISCONTINUED | OUTPATIENT
Start: 2024-09-13 | End: 2024-09-17

## 2024-09-13 RX ORDER — ACETAMINOPHEN 325 MG/1
650 TABLET ORAL EVERY 6 HOURS
Refills: 0 | Status: DISCONTINUED | OUTPATIENT
Start: 2024-09-13 | End: 2024-09-17

## 2024-09-13 RX ORDER — HYDROMORPHONE HYDROCHLORIDE 2 MG/1
0.5 TABLET ORAL ONCE
Refills: 0 | Status: DISCONTINUED | OUTPATIENT
Start: 2024-09-13 | End: 2024-09-13

## 2024-09-13 RX ORDER — HYDROMORPHONE HYDROCHLORIDE 2 MG/1
1 TABLET ORAL EVERY 6 HOURS
Refills: 0 | Status: DISCONTINUED | OUTPATIENT
Start: 2024-09-13 | End: 2024-09-14

## 2024-09-13 RX ORDER — POTASSIUM CHLORIDE 10 MEQ
20 TABLET, EXT RELEASE, PARTICLES/CRYSTALS ORAL ONCE
Refills: 0 | Status: COMPLETED | OUTPATIENT
Start: 2024-09-13 | End: 2024-09-13

## 2024-09-13 RX ADMIN — HYDROMORPHONE HYDROCHLORIDE 2 MILLIGRAM(S): 2 TABLET ORAL at 11:51

## 2024-09-13 RX ADMIN — Medication 2 MILLIGRAM(S): at 14:23

## 2024-09-13 RX ADMIN — HYDROMORPHONE HYDROCHLORIDE 2 MILLIGRAM(S): 2 TABLET ORAL at 00:30

## 2024-09-13 RX ADMIN — Medication 1 MILLIGRAM(S): at 19:03

## 2024-09-13 RX ADMIN — Medication 2 MILLIGRAM(S): at 20:32

## 2024-09-13 RX ADMIN — Medication 1 MILLIGRAM(S): at 19:33

## 2024-09-13 RX ADMIN — Medication 2 MILLIGRAM(S): at 20:49

## 2024-09-13 RX ADMIN — HYDROMORPHONE HYDROCHLORIDE 1 MILLIGRAM(S): 2 TABLET ORAL at 21:41

## 2024-09-13 RX ADMIN — HYDROMORPHONE HYDROCHLORIDE 0.5 MILLIGRAM(S): 2 TABLET ORAL at 06:09

## 2024-09-13 RX ADMIN — HYDROMORPHONE HYDROCHLORIDE 1 MILLIGRAM(S): 2 TABLET ORAL at 15:10

## 2024-09-13 RX ADMIN — ESCITALOPRAM OXALATE 20 MILLIGRAM(S): 10 TABLET ORAL at 13:47

## 2024-09-13 RX ADMIN — HYDROMORPHONE HYDROCHLORIDE 1 MILLIGRAM(S): 2 TABLET ORAL at 22:01

## 2024-09-13 RX ADMIN — HYDROMORPHONE HYDROCHLORIDE 0.5 MILLIGRAM(S): 2 TABLET ORAL at 05:54

## 2024-09-13 RX ADMIN — Medication 150 MILLILITER(S): at 10:03

## 2024-09-13 RX ADMIN — KETOROLAC TROMETHAMINE 15 MILLIGRAM(S): 30 INJECTION, SOLUTION INTRAMUSCULAR at 08:21

## 2024-09-13 RX ADMIN — Medication 30 MILLILITER(S): at 05:17

## 2024-09-13 RX ADMIN — HYDROMORPHONE HYDROCHLORIDE 1 MILLIGRAM(S): 2 TABLET ORAL at 15:40

## 2024-09-13 RX ADMIN — ENOXAPARIN SODIUM 40 MILLIGRAM(S): 100 INJECTION SUBCUTANEOUS at 05:18

## 2024-09-13 RX ADMIN — AMLODIPINE BESYLATE 5 MILLIGRAM(S): 10 TABLET ORAL at 05:16

## 2024-09-13 RX ADMIN — Medication 2 MILLIGRAM(S): at 13:53

## 2024-09-13 RX ADMIN — KETOROLAC TROMETHAMINE 15 MILLIGRAM(S): 30 INJECTION, SOLUTION INTRAMUSCULAR at 02:18

## 2024-09-13 RX ADMIN — Medication 80 MILLIGRAM(S): at 21:41

## 2024-09-13 RX ADMIN — Medication 20 MILLIEQUIVALENT(S): at 13:47

## 2024-09-13 RX ADMIN — HYDROMORPHONE HYDROCHLORIDE 2 MILLIGRAM(S): 2 TABLET ORAL at 11:21

## 2024-09-13 NOTE — PATIENT PROFILE ADULT - FUNCTIONAL ASSESSMENT - DAILY ACTIVITY 6.
Monitor: The problem is stable.  Evaluation: no clinical sympomts of depression ( adequate appettite,no low energy,sleeping well )   Assessment/Treatment:  patient stopped taking Bupropion  mg daily, but is seeing a therapist ( a psychologist ) on a weekly basis   Patient advised to call our office ( or the psychiatrist ) , if he feels like he needs the medication  Bupropion again.     4 = No assist / stand by assistance

## 2024-09-13 NOTE — PROGRESS NOTE ADULT - SUBJECTIVE AND OBJECTIVE BOX
Date of service: 09-13-24 @ 16:54      Patient is a 49y old  Male who presents with a chief complaint of Pancreatitis (13 Sep 2024 08:26)                                                               INTERVAL HPI/OVERNIGHT EVENTS:    REVIEW OF SYSTEMS:    Still with epigastric pain  Improves withIV  pain medications                                                                                                                                                                                                                                                                             Medications:  MEDICATIONS  (STANDING):  amLODIPine   Tablet 5 milliGRAM(s) Oral daily  enoxaparin Injectable 40 milliGRAM(s) SubCutaneous every 24 hours  escitalopram 20 milliGRAM(s) Oral daily  influenza   Vaccine 0.5 milliLiter(s) IntraMuscular once  lactated ringers. 1000 milliLiter(s) (150 mL/Hr) IV Continuous <Continuous>    MEDICATIONS  (PRN):  acetaminophen     Tablet .. 650 milliGRAM(s) Oral every 6 hours PRN Mild Pain (1 - 3)  aluminum hydroxide/magnesium hydroxide/simethicone Suspension 30 milliLiter(s) Oral every 4 hours PRN Dyspepsia  HYDROmorphone  Injectable 1 milliGRAM(s) IV Push every 6 hours PRN Severe Pain (7 - 10)  morphine  - Injectable 1 milliGRAM(s) IV Push every 4 hours PRN Moderate Pain (4 - 6)  morphine  - Injectable 2 milliGRAM(s) IV Push every 6 hours PRN Severe Pain (7 - 10)  ondansetron Injectable 4 milliGRAM(s) IV Push every 8 hours PRN Nausea and/or Vomiting       Allergies    No Known Allergies    Intolerances      Vital Signs Last 24 Hrs  T(C): 36.9 (13 Sep 2024 05:04), Max: 37.2 (12 Sep 2024 17:45)  T(F): 98.4 (13 Sep 2024 05:04), Max: 99 (12 Sep 2024 17:45)  HR: 67 (13 Sep 2024 05:04) (66 - 67)  BP: 131/78 (13 Sep 2024 05:04) (118/76 - 144/87)  BP(mean): --  RR: 18 (13 Sep 2024 05:04) (16 - 18)  SpO2: 97% (13 Sep 2024 05:04) (97% - 99%)    Parameters below as of 13 Sep 2024 05:04  Patient On (Oxygen Delivery Method): room air      CAPILLARY BLOOD GLUCOSE          Physical Exam:    Daily     Daily   General:  Well appearing, NAD, not cachetic  HEENT:  Nonicteric, PERRLA  CV:  RRR, S1S2   Lungs:  CTA B/L, no wheezes, rales, rhonchi  Abdomen:  SoftLeft upper quadrant tenderness  Extremities:  2+ pulses, no c/c, no edema  Skin:  Warm and dry, no rashes  :  No temple  Neuro:  AAOx3, non-focal, grossly intact                                                                                                                                                                                                                                                                                                LABS:                               13.8   15.38 )-----------( 151      ( 13 Sep 2024 06:01 )             38.9                      09-13    140  |  105  |  10  ----------------------------<  74  3.5   |  21<L>  |  0.72    Ca    8.1<L>      13 Sep 2024 06:01  Phos  2.7     09-12  Mg     1.90     09-12    TPro  x   /  Alb  3.4  /  TBili  x   /  DBili  x   /  AST  x   /  ALT  x   /  AlkPhos  x   09-13                       RADIOLOGY & ADDITIONAL TESTS         I personally reviewed: [  ]EKG   [  ]CXR    [  ] CT      A/P:         Discussed with :     Hayde consultants' Notes   Time spent :

## 2024-09-13 NOTE — PATIENT PROFILE ADULT - HISTORY OF COVID-19 VACCINATION
[de-identified] : Constitutional:  75 year old male, alert and oriented, cooperative, in no acute distress.  HEENT  NC/AT.  Appearance: symmetric  Chest/Respiratory  Respiratory effort: no intercostal retractions or use of accessory muscles. Nonlabored Breathing  Mental Status:  Judgment, insight: intact Orientation: oriented to time, place, and person  Left Knee  Inspection:     Skin intact, no rashes or lesions     No Effusion     Non-tender to palpation over tibial tubercle, lateral joint line, and pes insertion.     Tenderness over the medial joint line (anteriorly and at midcoronal line)     Tenderness over the patellar tendon  Range of Motion: 	Extension - 0 degrees 	Flexion - 120 degrees                 Alignment: Varus 3 degrees 	Extensor lag: None  Stability:      Demonstrates no Varus or Valgus instability      Negative Anterior or Posterior drawer.      Negative Lachman's      Negative McMurrays  Patella: stable, tracks well.   Right Knee  Inspection:     Skin intact, no rashes or lesions     No Effusion     Non-tender to palpation over tibial tubercle, medial and lateral joint line, and pes insertion.  Range of Motion: 	Extension - 0 degrees 	Flexion - 120 degrees 	Alignment - Varus 3 degrees 	Extensor lag: None  Stability:      Demonstrates no Varus or Valgus instability      Negative Anterior or Posterior drawer.      Negative Lachman's      Negative McMurrays  Patella: stable, tracks well.   Neurologic Exam     Motor intact including 5/5 Extensor Hallucis Longus, 5/5 Flexor Hallucis Longus, 5/5 Tibialis Anterior and 5/5 Gastrocnemius     Sensation Intact to Light Touch including Saphenous, Sural, Superficial Peroneal, Deep Peroneal, Tibial nerve distributions  Vascular Exam     Foot is warm and well perfused with 2+ Dorsalis Pedis Pulse   No pain with range of motion of the bilateral hips. [de-identified] : XRay:  XRays of the Pelvis (1 View) taken on 3/31/2023. XRays demonstrate no obvious fracture or dislocation. There is joint space narrowing in the bilateral hips. (my personal interpretation).   XRay: XRays of the Right Knee (4 Views) taken today in the office and discussed witht he patient. XRays demonstrate tricompartmental joint space narrowing with bone on bone articulations, with subchondral sclerosis, overlying osteophytes, all consistent with severe osteoarthritis, KL rdGrdrrdarddrderd:rd rd3rd. There appears to be a healed prior distal femur fracture. (my personal interpretation)   XRay: XRays of the Left Knee (4 Views) taken in the office today and reviewed with the patient. XRays demonstrate tricompartmental joint space narrowing, worse in the medial compartment, with subchondral sclerosis, overlying osteophytes, all consistent with severe osteoarthritis, KL thGthrthathdtheth:th th4th. There is varus alignment. (my personal interpretation)  Vaccine status unknown

## 2024-09-13 NOTE — PATIENT PROFILE ADULT - FUNCTIONAL ASSESSMENT - BASIC MOBILITY SCORE.
24 Isotretinoin Counseling: Patient should get monthly blood tests, not donate blood, not drive at night if vision affected, not share medication, and not undergo elective surgery for 6 months after tx completed. Side effects reviewed, pt to contact office should one occur.

## 2024-09-13 NOTE — PROGRESS NOTE ADULT - ASSESSMENT
49M PMHx gastritis, HTN who presents with 1 day of abdominal pain found to have acute pancreatitis.       Acute Pancreatitis.   ·  Plan: Etiology unclear.  No alcohol use, no history of gallstones, no recent abdominal procedures. TGs wnl.  - LR @ 150ccs/hr  - pain control, anti-emetics prn  - NPO for now....Patient still with abdominal  Worse with clear liquid diet...  MRCP: *  Acute interstitial edematous pancreatitis. No evidence of pancreatic   necrosis or drainable peripancreatic fluid collection.  *  No evidence of choledocholithiasis or biliary ductal dilatation.    Continue current managementWith IV fluids and medication  Will advance diet if tolerates In a.m.      ·  Problem: Hypertension.   ·  Plan: - c/w amlodipine.      Gastritis.   ·  Plan: - maalox prn  - outpatient GI follow up.

## 2024-09-13 NOTE — PROGRESS NOTE ADULT - SUBJECTIVE AND OBJECTIVE BOX
Interval Events: labs noted. pt seen and examined. states he had a difficult night- worsening reflux/belching and abd pain. +nausea/dry heaves. taking in minimal clears      Allergies:  No Known Allergies      Hospital Medications:  aluminum hydroxide/magnesium hydroxide/simethicone Suspension 30 milliLiter(s) Oral every 4 hours PRN  amLODIPine   Tablet 5 milliGRAM(s) Oral daily  enoxaparin Injectable 40 milliGRAM(s) SubCutaneous every 24 hours  escitalopram 20 milliGRAM(s) Oral daily  HYDROmorphone   Tablet 2 milliGRAM(s) Oral every 4 hours PRN  influenza   Vaccine 0.5 milliLiter(s) IntraMuscular once  lactated ringers. 1000 milliLiter(s) IV Continuous <Continuous>  ondansetron Injectable 4 milliGRAM(s) IV Push every 8 hours PRN      ROS: As per HPI, otherwise 10-point ROS reviewed and negative.    Vital Signs:  Vital Signs Last 24 Hrs  T(C): 36.9 (13 Sep 2024 05:04), Max: 37.2 (12 Sep 2024 17:45)  T(F): 98.4 (13 Sep 2024 05:04), Max: 99 (12 Sep 2024 17:45)  HR: 67 (13 Sep 2024 05:04) (63 - 71)  BP: 131/78 (13 Sep 2024 05:04) (113/84 - 144/87)  BP(mean): --  RR: 18 (13 Sep 2024 05:04) (16 - 18)  SpO2: 97% (13 Sep 2024 05:04) (97% - 99%)    Parameters below as of 13 Sep 2024 05:04  Patient On (Oxygen Delivery Method): room air      Daily     Daily         LABS:                        13.8   15.38 )-----------( 151      ( 13 Sep 2024 06:01 )             38.9     Mean Cell Volume: 87.6 fL (09-13-24 @ 06:01)    09-13    140  |  105  |  10  ----------------------------<  74  3.5   |  21<L>  |  0.72    Ca    8.1<L>      13 Sep 2024 06:01  Phos  2.7     09-12  Mg     1.90     09-12    TPro  6.9  /  Alb  3.9  /  TBili  0.9  /  DBili  <0.2  /  AST  17  /  ALT  30  /  AlkPhos  68  09-12    LIVER FUNCTIONS - ( 12 Sep 2024 07:17 )  Alb: 3.9 g/dL / Pro: 6.9 g/dL / ALK PHOS: 68 U/L / ALT: 30 U/L / AST: 17 U/L / GGT: x           PT/INR - ( 11 Sep 2024 20:35 )   PT: 11.8 sec;   INR: 1.06 ratio         PTT - ( 11 Sep 2024 20:35 )  PTT:32.6 sec  Urinalysis Basic - ( 13 Sep 2024 06:01 )    Color: x / Appearance: x / SG: x / pH: x  Gluc: 74 mg/dL / Ketone: x  / Bili: x / Urobili: x   Blood: x / Protein: x / Nitrite: x   Leuk Esterase: x / RBC: x / WBC x   Sq Epi: x / Non Sq Epi: x / Bacteria: x                              13.8   15.38 )-----------( 151      ( 13 Sep 2024 06:01 )             38.9                         13.2   9.79  )-----------( 259      ( 12 Sep 2024 07:17 )             39.8                         18.3   19.68 )-----------( 274      ( 11 Sep 2024 20:35 )             52.8       PHYSICAL EXAM  GENERAL: lying in bed, in no apparent distress  HEENT: NCAT  EYES: anicteric sclerae  NECK: trachea midline, FROM  CHEST:  respirations even, non labored  ABDOMEN:  soft, generalized ttp most prominent mid abd, nd  EXTREMITIES: WWP, no edema  SKIN:  warm/dry, no visible rash appreciated  PSYCH: appropriate affect, A&O x 3  NEURO: no tremor noted

## 2024-09-13 NOTE — PATIENT PROFILE ADULT - NSPROPOAURINARYCATHETER_GEN_A_NUR
CHIEF COMPLAINT  Chief Complaint   Patient presents with    Cough     Patient was diagnosed with a sinus infection on Tuesday and feels like it is moving into his chest. Patient reports taking antibiotics for tooth infection.        HISTORY OF PRESENT ILLNESS  Roscoe Nagel is a 44 y.o. male pmh of ASTHMA, HYPERLIPIDEMIA presenting with SINUS CONGESTION, COUGH for the past 1 week. PATIENT WENT TO URGENT CARE 3 DAYS AGO HE WAS PRESCRIBED PREDNISONE AND PROMETHAZINE DM WITH SOME IMPROVEMENT. No other specific aggravating or relieving factors otherwise.      PAST MEDICAL HISTORY  Past Medical History:   Diagnosis Date    Asthma     Hyperlipidemia        CURRENT MEDICATIONS    No current facility-administered medications for this encounter.    Current Outpatient Medications:     albuterol (PROVENTIL/VENTOLIN HFA) 90 mcg/actuation inhaler, Inhale 1-2 puffs into the lungs every 6 (six) hours as needed for Wheezing or Shortness of Breath. Rescue, Disp: 6.7 g, Rfl: 0    amoxicillin-clavulanate 875-125mg (AUGMENTIN) 875-125 mg per tablet, Take 1 tablet by mouth 2 (two) times daily. (Patient not taking: Reported on 5/30/2024), Disp: , Rfl:     atorvastatin (LIPITOR) 20 MG tablet, Take 1 tablet (20 mg total) by mouth every evening., Disp: 90 tablet, Rfl: 3    fluticasone propionate (FLONASE) 50 mcg/actuation nasal spray, 1 spray (50 mcg total) by Each Nostril route once daily., Disp: 16 g, Rfl: 0    guaiFENesin-codeine 100-10 mg/5 ml (TUSSI-ORGANIDIN NR)  mg/5 mL syrup, Take 5 mLs by mouth every 4 (four) hours as needed for Cough or Congestion., Disp: 100 mL, Rfl: 0    neomycin-polymyxin-hydrocortisone (CORTISPORIN) 3.5-10,000-1 mg/mL-unit/mL-% otic suspension, Place 3 drops into the left ear 3 (three) times daily. (Patient not taking: Reported on 1/17/2024), Disp: 10 mL, Rfl: 0    predniSONE (DELTASONE) 10 MG tablet, Take 2 tabs today, then 1 tab po qd x 6 days (Patient not taking: Reported on 1/17/2024), Disp: 8  "tablet, Rfl: 0    predniSONE (DELTASONE) 10 MG tablet, Take 2 tabs today, then 1 tab po qd x 6 days (Patient not taking: Reported on 5/30/2024), Disp: 8 tablet, Rfl: 0    predniSONE (DELTASONE) 10 MG tablet, Take 2 tabs today, then 1 tab po qd x 6 days, Disp: 8 tablet, Rfl: 0    tobramycin sulfate 0.3% (TOBREX) 0.3 % ophthalmic solution, Place 1 drop into the left eye every 4 (four) hours., Disp: 5 mL, Rfl: 0    ALLERGIES    Review of patient's allergies indicates:  No Known Allergies    SURGICAL HISTORY    Past Surgical History:   Procedure Laterality Date    ELBOW SURGERY Left     nerve release       SOCIAL HISTORY    Social History     Socioeconomic History    Marital status: Significant Other   Tobacco Use    Smoking status: Never     Passive exposure: Never    Smokeless tobacco: Never       FAMILY HISTORY    No family history on file.    REVIEW OF SYSTEMS    Review of Systems   Respiratory:  Positive for cough and wheezing.      All other systems reviewed and are negative    VITAL SIGNS:   BP (!) 143/84 (BP Location: Right arm, Patient Position: Sitting)   Pulse 72   Temp 98.4 °F (36.9 °C) (Oral)   Resp 16   Ht 5' 9" (1.753 m)   Wt 119.3 kg (263 lb)   SpO2 100%   BMI 38.84 kg/m²      Physical Exam  Constitutional:       Appearance: Normal appearance.   HENT:      Head: Normocephalic.      Right Ear: Tympanic membrane and external ear normal.      Left Ear: Tympanic membrane, ear canal and external ear normal.      Mouth/Throat:      Mouth: Mucous membranes are moist.   Eyes:      General:         Right eye: No foreign body.         Left eye: Discharge present.No foreign body.      Pupils: Pupils are equal, round, and reactive to light.   Cardiovascular:      Rate and Rhythm: Normal rate.   Pulmonary:      Effort: Pulmonary effort is normal. No respiratory distress.      Breath sounds: Examination of the right-lower field reveals wheezing. Examination of the left-lower field reveals wheezing. Wheezing " present.   Abdominal:      Palpations: Abdomen is soft.   Musculoskeletal:         General: Normal range of motion.   Skin:     General: Skin is warm.      Capillary Refill: Capillary refill takes less than 2 seconds.   Neurological:      General: No focal deficit present.      Mental Status: He is alert.      GCS: GCS eye subscore is 4. GCS verbal subscore is 5. GCS motor subscore is 6.   Psychiatric:         Attention and Perception: Attention normal.         Mood and Affect: Mood normal.         Speech: Speech normal.       Vitals and nursing note reviewed.     LABS    Labs Reviewed - No data to display      EKG    No results found for this or any previous visit.      RADIOLOGY    X-Ray Chest PA And Lateral   Final Result            PROCEDURES    Procedures    Medications   albuterol-ipratropium 2.5 mg-0.5 mg/3 mL nebulizer solution 3 mL (3 mLs Nebulization Given 6/2/24 1030)                Medical Decision Making  Roscoe Nagel is a 44 y.o. male pmh of ASTHMA, HYPERLIPIDEMIA presenting with SINUS CONGESTION, COUGH for the past 1 week. PATIENT WENT TO URGENT CARE 3 DAYS AGO HE WAS PRESCRIBED PREDNISONE AND PROMETHAZINE DM WITH SOME IMPROVEMENT. No other specific aggravating or relieving factors otherwise.    Differential Diagnosis includes, but is not limited to:   Sepsis, meningitis, cavernous sinus thrombosis, nasal foreign body, otitis media/external, nasal polyp, bacterial sinusitis, allergic rhinitis, influenza, bacterial/viral pharyngitis, peritonsillar abscess, retropharyngeal abscess, bacterial/viral pneumonia.   Clinical impression: URI   At this time, I feel there is no emergent condition requiring further evaluation or admission. I believe the patient is stable for discharge from the ED. The patient and any additional family present were updated with test results, overall clinical impression, and recommended further plan of care. All questions were answered. patient/caregiver expressed understanding  and agreed with current plan for discharge with PCP follow-up within 1 week. Strict return precautions were provided, including fever, N/V, headache, neck stiffness, return/worsening of current symptoms or any other concerns.      Problems Addressed:  Conjunctivitis of left eye, unspecified conjunctivitis type: acute illness or injury  Cough: acute illness or injury  URI (upper respiratory infection): acute illness or injury    Amount and/or Complexity of Data Reviewed  Radiology: ordered. Decision-making details documented in ED Course.     Details: Normal     Risk  OTC drugs.  Prescription drug management.           Discharge Medication List as of 6/2/2024 11:13 AM        STOP taking these medications       promethazine-dextromethorphan (PROMETHAZINE-DM) 6.25-15 mg/5 mL Syrp Comments:   Reason for Stopping:               Discharge Medication List as of 6/2/2024 11:13 AM        START taking these medications    Details   albuterol (PROVENTIL/VENTOLIN HFA) 90 mcg/actuation inhaler Inhale 1-2 puffs into the lungs every 6 (six) hours as needed for Wheezing or Shortness of Breath. Rescue, Starting Sun 6/2/2024, Until Mon 6/2/2025 at 2359, Normal      guaiFENesin-codeine 100-10 mg/5 ml (TUSSI-ORGANIDIN NR)  mg/5 mL syrup Take 5 mLs by mouth every 4 (four) hours as needed for Cough or Congestion., Starting Sun 6/2/2024, Normal             I discussed with patient that evaluation in the ED does not suggest any emergent or life threatening medical condition requiring immediate intervention beyond what was provided in the ED, and I believe the patient is safe for discharge.  Regardless, an unremarkable evaluation in the ED does not preclude the development or presence of a serious or life threatening condition. As such, patient was instructed to return immediately for any worsening or change in current symptoms  Patient agrees with plan of care.    DISPOSITION  Patient discharged to home in stable  condition.        FINAL IMPRESSION    1. URI (upper respiratory infection)    2. Cough    3. Conjunctivitis of left eye, unspecified conjunctivitis type         John Friedman, JAIDA  06/02/24 9862     no

## 2024-09-13 NOTE — PROGRESS NOTE ADULT - ASSESSMENT
49M PMHx gastritis, HTN who presents with abdominal pain. CTA AP showing diffuse peripancreatic fat stranding/fluid suggesting acute pancreatitis, no peripancreatic collection or evidence of parenchymal necrosis.    # acute pancreatitis   # acute abdominal pain  - p/w acute epigastric abd pain x 1 day with associated nausea and non bloody vomiting with imaging as above, no gs on CT, no hx of etoh use, tg wnl, no recent viral illnesses or new meds, fhx n/c, unclear etiology- ddx includes gs/sludge, idiopathic, malignancy, anatomic abnormality, less likely AI  # hx of chronic upper gi complaints sp egd last yr reportedly unrevealing      Recommendations-  - IVF 1.5cc/kg/hr  - early nutrition with clears as able to tolerate  - check igg4 for completion, trend leukocytosis  - change to IV PPI BID, pepcid qHs, maalox prn, anti emetics prn if qtc ok/no med CI  - f/u MRCP to further evaluate for gs/sludge and r/o any anatomical abnormalities  - rest of care as per primary team    *all recommendations are tentative until note is attested by attending*    ARMANDO Hernandez PA-C, RD, CDN  available on TEAMS M/T/TH/F from 7am - 4pm    after hours/weekends: non urgent issues --> email [email or web address], urgent issues --> contact on-call GI fellow at [phone]

## 2024-09-14 LAB
ADD ON TEST-SPECIMEN IN LAB: SIGNIFICANT CHANGE UP
ALBUMIN SERPL ELPH-MCNC: 3.7 G/DL — SIGNIFICANT CHANGE UP (ref 3.3–5)
ALP SERPL-CCNC: 60 U/L — SIGNIFICANT CHANGE UP (ref 40–120)
ALT FLD-CCNC: 8 U/L — SIGNIFICANT CHANGE UP (ref 4–41)
ANION GAP SERPL CALC-SCNC: 14 MMOL/L — SIGNIFICANT CHANGE UP (ref 7–14)
AST SERPL-CCNC: 15 U/L — SIGNIFICANT CHANGE UP (ref 4–40)
BASOPHILS # BLD AUTO: 0.06 K/UL — SIGNIFICANT CHANGE UP (ref 0–0.2)
BASOPHILS NFR BLD AUTO: 0.4 % — SIGNIFICANT CHANGE UP (ref 0–2)
BILIRUB SERPL-MCNC: 0.8 MG/DL — SIGNIFICANT CHANGE UP (ref 0.2–1.2)
BUN SERPL-MCNC: 8 MG/DL — SIGNIFICANT CHANGE UP (ref 7–23)
CALCIUM SERPL-MCNC: 8.5 MG/DL — SIGNIFICANT CHANGE UP (ref 8.4–10.5)
CHLORIDE SERPL-SCNC: 103 MMOL/L — SIGNIFICANT CHANGE UP (ref 98–107)
CO2 SERPL-SCNC: 22 MMOL/L — SIGNIFICANT CHANGE UP (ref 22–31)
CREAT SERPL-MCNC: 0.65 MG/DL — SIGNIFICANT CHANGE UP (ref 0.5–1.3)
EGFR: 116 ML/MIN/1.73M2 — SIGNIFICANT CHANGE UP
EOSINOPHIL # BLD AUTO: 0.16 K/UL — SIGNIFICANT CHANGE UP (ref 0–0.5)
EOSINOPHIL NFR BLD AUTO: 1 % — SIGNIFICANT CHANGE UP (ref 0–6)
GLUCOSE SERPL-MCNC: 68 MG/DL — LOW (ref 70–99)
HCT VFR BLD CALC: 37.3 % — LOW (ref 39–50)
HGB BLD-MCNC: 13 G/DL — SIGNIFICANT CHANGE UP (ref 13–17)
IANC: 13.17 K/UL — HIGH (ref 1.8–7.4)
IMM GRANULOCYTES NFR BLD AUTO: 0.6 % — SIGNIFICANT CHANGE UP (ref 0–0.9)
LIDOCAIN IGE QN: 55 U/L — SIGNIFICANT CHANGE UP (ref 7–60)
LYMPHOCYTES # BLD AUTO: 1.32 K/UL — SIGNIFICANT CHANGE UP (ref 1–3.3)
LYMPHOCYTES # BLD AUTO: 8.2 % — LOW (ref 13–44)
MAGNESIUM SERPL-MCNC: 1.9 MG/DL — SIGNIFICANT CHANGE UP (ref 1.6–2.6)
MCHC RBC-ENTMCNC: 30.8 PG — SIGNIFICANT CHANGE UP (ref 27–34)
MCHC RBC-ENTMCNC: 34.9 GM/DL — SIGNIFICANT CHANGE UP (ref 32–36)
MCV RBC AUTO: 88.4 FL — SIGNIFICANT CHANGE UP (ref 80–100)
MONOCYTES # BLD AUTO: 1.33 K/UL — HIGH (ref 0–0.9)
MONOCYTES NFR BLD AUTO: 8.2 % — SIGNIFICANT CHANGE UP (ref 2–14)
NEUTROPHILS # BLD AUTO: 13.17 K/UL — HIGH (ref 1.8–7.4)
NEUTROPHILS NFR BLD AUTO: 81.6 % — HIGH (ref 43–77)
NRBC # BLD: 0 /100 WBCS — SIGNIFICANT CHANGE UP (ref 0–0)
NRBC # FLD: 0 K/UL — SIGNIFICANT CHANGE UP (ref 0–0)
PHOSPHATE SERPL-MCNC: 1.6 MG/DL — LOW (ref 2.5–4.5)
PLATELET # BLD AUTO: 166 K/UL — SIGNIFICANT CHANGE UP (ref 150–400)
POTASSIUM SERPL-MCNC: 3.3 MMOL/L — LOW (ref 3.5–5.3)
POTASSIUM SERPL-SCNC: 3.3 MMOL/L — LOW (ref 3.5–5.3)
PROT SERPL-MCNC: 6.1 G/DL — SIGNIFICANT CHANGE UP (ref 6–8.3)
RBC # BLD: 4.22 M/UL — SIGNIFICANT CHANGE UP (ref 4.2–5.8)
RBC # FLD: 13.1 % — SIGNIFICANT CHANGE UP (ref 10.3–14.5)
SODIUM SERPL-SCNC: 139 MMOL/L — SIGNIFICANT CHANGE UP (ref 135–145)
WBC # BLD: 16.13 K/UL — HIGH (ref 3.8–10.5)
WBC # FLD AUTO: 16.13 K/UL — HIGH (ref 3.8–10.5)

## 2024-09-14 RX ORDER — HYDROMORPHONE HYDROCHLORIDE 2 MG/1
0.5 TABLET ORAL ONCE
Refills: 0 | Status: DISCONTINUED | OUTPATIENT
Start: 2024-09-14 | End: 2024-09-14

## 2024-09-14 RX ORDER — HYDROMORPHONE HYDROCHLORIDE 2 MG/1
1 TABLET ORAL EVERY 4 HOURS
Refills: 0 | Status: DISCONTINUED | OUTPATIENT
Start: 2024-09-14 | End: 2024-09-14

## 2024-09-14 RX ORDER — HYDROMORPHONE HYDROCHLORIDE 2 MG/1
1 TABLET ORAL EVERY 4 HOURS
Refills: 0 | Status: DISCONTINUED | OUTPATIENT
Start: 2024-09-14 | End: 2024-09-16

## 2024-09-14 RX ORDER — FAMOTIDINE 10 MG/ML
40 INJECTION INTRAVENOUS AT BEDTIME
Refills: 0 | Status: DISCONTINUED | OUTPATIENT
Start: 2024-09-14 | End: 2024-09-17

## 2024-09-14 RX ORDER — SODIUM PHOSPHATE, DIBASIC, ANHYDROUS, POTASSIUM PHOSPHATE, MONOBASIC, AND SODIUM PHOSPHATE, MONOBASIC, MONOHYDRATE 852; 155; 130 MG/1; MG/1; MG/1
2 TABLET, COATED ORAL
Refills: 0 | Status: COMPLETED | OUTPATIENT
Start: 2024-09-14 | End: 2024-09-15

## 2024-09-14 RX ORDER — PANTOPRAZOLE SODIUM 40 MG
40 TABLET, DELAYED RELEASE (ENTERIC COATED) ORAL EVERY 12 HOURS
Refills: 0 | Status: DISCONTINUED | OUTPATIENT
Start: 2024-09-14 | End: 2024-09-16

## 2024-09-14 RX ORDER — POTASSIUM CHLORIDE 10 MEQ
40 TABLET, EXT RELEASE, PARTICLES/CRYSTALS ORAL ONCE
Refills: 0 | Status: COMPLETED | OUTPATIENT
Start: 2024-09-14 | End: 2024-09-14

## 2024-09-14 RX ADMIN — HYDROMORPHONE HYDROCHLORIDE 1 MILLIGRAM(S): 2 TABLET ORAL at 12:51

## 2024-09-14 RX ADMIN — Medication 2 MILLIGRAM(S): at 00:52

## 2024-09-14 RX ADMIN — HYDROMORPHONE HYDROCHLORIDE 1 MILLIGRAM(S): 2 TABLET ORAL at 22:11

## 2024-09-14 RX ADMIN — HYDROMORPHONE HYDROCHLORIDE 1 MILLIGRAM(S): 2 TABLET ORAL at 07:11

## 2024-09-14 RX ADMIN — ENOXAPARIN SODIUM 40 MILLIGRAM(S): 100 INJECTION SUBCUTANEOUS at 05:11

## 2024-09-14 RX ADMIN — AMLODIPINE BESYLATE 5 MILLIGRAM(S): 10 TABLET ORAL at 05:12

## 2024-09-14 RX ADMIN — HYDROMORPHONE HYDROCHLORIDE 0.5 MILLIGRAM(S): 2 TABLET ORAL at 02:49

## 2024-09-14 RX ADMIN — FAMOTIDINE 40 MILLIGRAM(S): 10 INJECTION INTRAVENOUS at 21:13

## 2024-09-14 RX ADMIN — ESCITALOPRAM OXALATE 20 MILLIGRAM(S): 10 TABLET ORAL at 12:53

## 2024-09-14 RX ADMIN — ONDANSETRON 4 MILLIGRAM(S): 2 INJECTION, SOLUTION INTRAMUSCULAR; INTRAVENOUS at 20:16

## 2024-09-14 RX ADMIN — Medication 2 MILLIGRAM(S): at 10:39

## 2024-09-14 RX ADMIN — HYDROMORPHONE HYDROCHLORIDE 1 MILLIGRAM(S): 2 TABLET ORAL at 17:10

## 2024-09-14 RX ADMIN — Medication 2 MILLIGRAM(S): at 11:30

## 2024-09-14 RX ADMIN — HYDROMORPHONE HYDROCHLORIDE 1 MILLIGRAM(S): 2 TABLET ORAL at 06:56

## 2024-09-14 RX ADMIN — Medication 1 MILLIGRAM(S): at 05:31

## 2024-09-14 RX ADMIN — Medication 2 MILLIGRAM(S): at 01:07

## 2024-09-14 RX ADMIN — HYDROMORPHONE HYDROCHLORIDE 0.5 MILLIGRAM(S): 2 TABLET ORAL at 03:05

## 2024-09-14 RX ADMIN — HYDROMORPHONE HYDROCHLORIDE 1 MILLIGRAM(S): 2 TABLET ORAL at 13:51

## 2024-09-14 RX ADMIN — Medication 80 MILLIGRAM(S): at 05:12

## 2024-09-14 RX ADMIN — Medication 1 MILLIGRAM(S): at 05:11

## 2024-09-14 RX ADMIN — HYDROMORPHONE HYDROCHLORIDE 1 MILLIGRAM(S): 2 TABLET ORAL at 18:10

## 2024-09-14 RX ADMIN — SODIUM PHOSPHATE, DIBASIC, ANHYDROUS, POTASSIUM PHOSPHATE, MONOBASIC, AND SODIUM PHOSPHATE, MONOBASIC, MONOHYDRATE 2 PACKET(S): 852; 155; 130 TABLET, COATED ORAL at 17:13

## 2024-09-14 RX ADMIN — HYDROMORPHONE HYDROCHLORIDE 1 MILLIGRAM(S): 2 TABLET ORAL at 21:11

## 2024-09-14 RX ADMIN — Medication 40 MILLIEQUIVALENT(S): at 12:53

## 2024-09-14 RX ADMIN — Medication 40 MILLIGRAM(S): at 17:16

## 2024-09-14 NOTE — PROGRESS NOTE ADULT - ASSESSMENT
49M PMHx gastritis, HTN who presents with 1 day of abdominal pain found to have acute pancreatitis.       Acute Pancreatitis.   ·  Plan: Etiology unclear.  No alcohol use, no history of gallstones, no recent abdominal procedures. TGs wnl  - pain control, anti-emetics prn  MRCP: *  Acute interstitial edematous pancreatitis. No evidence of pancreatic   necrosis or drainable peripancreatic fluid collection.  *  No evidence of choledocholithiasis or biliary ductal dilatation.    Continue current managementWith IV fluids and pain medication  slowly advance diet as tolerates     ·  Problem: Hypertension.   ·  Plan: - c/w amlodipine.    Gastritis.   ·  Plan: - maalox prn  - outpatient GI follow up.

## 2024-09-14 NOTE — PROGRESS NOTE ADULT - SUBJECTIVE AND OBJECTIVE BOX
Date of service: 09-14-24 @ 23:31      Patient is a 49y old  Male who presents with a chief complaint of Pancreatitis (13 Sep 2024 16:53)                                                               INTERVAL HPI/OVERNIGHT EVENTS:    REVIEW OF SYSTEMS:     CONSTITUTIONAL: No weakness, fevers or chills  EYES/ENT: No visual changes , no ear ache   NECK: No pain or stiffness  RESPIRATORY: No cough, wheezing,  No shortness of breath  CARDIOVASCULAR: No chest pain or palpitations  GASTROINTESTINAL:improving  abdominal pain  . No nausea, vomiting, or hematemesis  no BM for 3days   GENITOURINARY: No dysuria, frequency or hematuria  NEUROLOGICAL: No numbness or weakness  SKIN: No itching, burning, rashes, or lesions                                                                                                                                                                                                                                                                                 Medications:  MEDICATIONS  (STANDING):  amLODIPine   Tablet 5 milliGRAM(s) Oral daily  enoxaparin Injectable 40 milliGRAM(s) SubCutaneous every 24 hours  escitalopram 20 milliGRAM(s) Oral daily  famotidine    Tablet 40 milliGRAM(s) Oral at bedtime  influenza   Vaccine 0.5 milliLiter(s) IntraMuscular once  lactated ringers. 1000 milliLiter(s) (150 mL/Hr) IV Continuous <Continuous>  pantoprazole  Injectable 40 milliGRAM(s) IV Push every 12 hours  potassium phosphate / sodium phosphate Powder (PHOS-NaK) 2 Packet(s) Oral two times a day    MEDICATIONS  (PRN):  acetaminophen     Tablet .. 650 milliGRAM(s) Oral every 6 hours PRN Mild Pain (1 - 3)  aluminum hydroxide/magnesium hydroxide/simethicone Suspension 30 milliLiter(s) Oral every 4 hours PRN Dyspepsia  HYDROmorphone  Injectable 1 milliGRAM(s) IV Push every 4 hours PRN Severe Pain (7 - 10)  ondansetron Injectable 4 milliGRAM(s) IV Push every 8 hours PRN Nausea and/or Vomiting       Allergies    No Known Allergies    Intolerances      Vital Signs Last 24 Hrs  T(C): 37.3 (14 Sep 2024 21:16), Max: 37.3 (14 Sep 2024 21:16)  T(F): 99.1 (14 Sep 2024 21:16), Max: 99.1 (14 Sep 2024 21:16)  HR: 65 (14 Sep 2024 21:16) (65 - 90)  BP: 129/82 (14 Sep 2024 21:16) (129/82 - 139/72)  BP(mean): --  RR: 17 (14 Sep 2024 21:16) (17 - 18)  SpO2: 100% (14 Sep 2024 21:16) (99% - 100%)    Parameters below as of 14 Sep 2024 21:16  Patient On (Oxygen Delivery Method): room air      CAPILLARY BLOOD GLUCOSE      POCT Blood Glucose.: 125 mg/dL (14 Sep 2024 09:44)      Physical Exam:    Daily     Daily   General:  Well appearing, NAD, not cachetic  HEENT:  Nonicteric, PERRLA  CV:  RRR, S1S2   Lungs:  CTA B/L, no wheezes, rales, rhonchi  Abdomen:  Soft  Extremities:  2+ pulses, no c/c, no edema  Skin:  Warm and dry, no rashes  :  No temple  Neuro:  AAOx3, non-focal, grossly intact                                                                                                                                                                                                                                                                                                LABS:                               13.0   16.13 )-----------( 166      ( 14 Sep 2024 06:30 )             37.3                      09-14    139  |  103  |  8   ----------------------------<  68<L>  3.3<L>   |  22  |  0.65    Ca    8.5      14 Sep 2024 06:30  Phos  1.6     09-14  Mg     1.90     09-14    TPro  6.1  /  Alb  3.7  /  TBili  0.8  /  DBili  x   /  AST  15  /  ALT  8   /  AlkPhos  60  09-14                       RADIOLOGY & ADDITIONAL TESTS         I personally reviewed: [  ]EKG   [  ]CXR    [  ] CT      A/P:         Discussed with :     [facility] consultants' Notes   Time spent :

## 2024-09-15 LAB
ANION GAP SERPL CALC-SCNC: 15 MMOL/L — HIGH (ref 7–14)
BUN SERPL-MCNC: 7 MG/DL — SIGNIFICANT CHANGE UP (ref 7–23)
CALCIUM SERPL-MCNC: 8.5 MG/DL — SIGNIFICANT CHANGE UP (ref 8.4–10.5)
CHLORIDE SERPL-SCNC: 102 MMOL/L — SIGNIFICANT CHANGE UP (ref 98–107)
CO2 SERPL-SCNC: 22 MMOL/L — SIGNIFICANT CHANGE UP (ref 22–31)
CREAT SERPL-MCNC: 0.62 MG/DL — SIGNIFICANT CHANGE UP (ref 0.5–1.3)
EGFR: 117 ML/MIN/1.73M2 — SIGNIFICANT CHANGE UP
GLUCOSE SERPL-MCNC: 75 MG/DL — SIGNIFICANT CHANGE UP (ref 70–99)
HCT VFR BLD CALC: 35.9 % — LOW (ref 39–50)
HGB BLD-MCNC: 12.5 G/DL — LOW (ref 13–17)
MAGNESIUM SERPL-MCNC: 1.9 MG/DL — SIGNIFICANT CHANGE UP (ref 1.6–2.6)
MCHC RBC-ENTMCNC: 31 PG — SIGNIFICANT CHANGE UP (ref 27–34)
MCHC RBC-ENTMCNC: 34.8 GM/DL — SIGNIFICANT CHANGE UP (ref 32–36)
MCV RBC AUTO: 89.1 FL — SIGNIFICANT CHANGE UP (ref 80–100)
NRBC # BLD: 0 /100 WBCS — SIGNIFICANT CHANGE UP (ref 0–0)
NRBC # FLD: 0 K/UL — SIGNIFICANT CHANGE UP (ref 0–0)
PHOSPHATE SERPL-MCNC: 2.2 MG/DL — LOW (ref 2.5–4.5)
PLATELET # BLD AUTO: 173 K/UL — SIGNIFICANT CHANGE UP (ref 150–400)
POTASSIUM SERPL-MCNC: 3.4 MMOL/L — LOW (ref 3.5–5.3)
POTASSIUM SERPL-SCNC: 3.4 MMOL/L — LOW (ref 3.5–5.3)
RBC # BLD: 4.03 M/UL — LOW (ref 4.2–5.8)
RBC # FLD: 12.6 % — SIGNIFICANT CHANGE UP (ref 10.3–14.5)
SODIUM SERPL-SCNC: 139 MMOL/L — SIGNIFICANT CHANGE UP (ref 135–145)
WBC # BLD: 14.13 K/UL — HIGH (ref 3.8–10.5)
WBC # FLD AUTO: 14.13 K/UL — HIGH (ref 3.8–10.5)

## 2024-09-15 RX ORDER — HYDROMORPHONE HYDROCHLORIDE 2 MG/1
0.5 TABLET ORAL ONCE
Refills: 0 | Status: DISCONTINUED | OUTPATIENT
Start: 2024-09-15 | End: 2024-09-15

## 2024-09-15 RX ORDER — POLYETHYLENE GLYCOL 3350 17 G/17G
17 POWDER, FOR SOLUTION ORAL DAILY
Refills: 0 | Status: DISCONTINUED | OUTPATIENT
Start: 2024-09-15 | End: 2024-09-17

## 2024-09-15 RX ORDER — SODIUM PHOSPHATE, DIBASIC, ANHYDROUS, POTASSIUM PHOSPHATE, MONOBASIC, AND SODIUM PHOSPHATE, MONOBASIC, MONOHYDRATE 852; 155; 130 MG/1; MG/1; MG/1
1 TABLET, COATED ORAL ONCE
Refills: 0 | Status: COMPLETED | OUTPATIENT
Start: 2024-09-15 | End: 2024-09-15

## 2024-09-15 RX ORDER — POTASSIUM CHLORIDE 10 MEQ
40 TABLET, EXT RELEASE, PARTICLES/CRYSTALS ORAL ONCE
Refills: 0 | Status: COMPLETED | OUTPATIENT
Start: 2024-09-15 | End: 2024-09-15

## 2024-09-15 RX ORDER — SENNA 187 MG
2 TABLET ORAL AT BEDTIME
Refills: 0 | Status: DISCONTINUED | OUTPATIENT
Start: 2024-09-15 | End: 2024-09-17

## 2024-09-15 RX ORDER — POLYETHYLENE GLYCOL 3350 17 G/17G
17 POWDER, FOR SOLUTION ORAL
Refills: 0 | Status: DISCONTINUED | OUTPATIENT
Start: 2024-09-15 | End: 2024-09-15

## 2024-09-15 RX ADMIN — SODIUM PHOSPHATE, DIBASIC, ANHYDROUS, POTASSIUM PHOSPHATE, MONOBASIC, AND SODIUM PHOSPHATE, MONOBASIC, MONOHYDRATE 2 PACKET(S): 852; 155; 130 TABLET, COATED ORAL at 05:32

## 2024-09-15 RX ADMIN — HYDROMORPHONE HYDROCHLORIDE 1 MILLIGRAM(S): 2 TABLET ORAL at 09:48

## 2024-09-15 RX ADMIN — HYDROMORPHONE HYDROCHLORIDE 1 MILLIGRAM(S): 2 TABLET ORAL at 17:55

## 2024-09-15 RX ADMIN — Medication 40 MILLIEQUIVALENT(S): at 13:49

## 2024-09-15 RX ADMIN — ESCITALOPRAM OXALATE 20 MILLIGRAM(S): 10 TABLET ORAL at 13:53

## 2024-09-15 RX ADMIN — HYDROMORPHONE HYDROCHLORIDE 1 MILLIGRAM(S): 2 TABLET ORAL at 14:50

## 2024-09-15 RX ADMIN — HYDROMORPHONE HYDROCHLORIDE 1 MILLIGRAM(S): 2 TABLET ORAL at 01:16

## 2024-09-15 RX ADMIN — Medication 40 MILLIGRAM(S): at 17:55

## 2024-09-15 RX ADMIN — HYDROMORPHONE HYDROCHLORIDE 1 MILLIGRAM(S): 2 TABLET ORAL at 18:50

## 2024-09-15 RX ADMIN — HYDROMORPHONE HYDROCHLORIDE 1 MILLIGRAM(S): 2 TABLET ORAL at 13:50

## 2024-09-15 RX ADMIN — AMLODIPINE BESYLATE 5 MILLIGRAM(S): 10 TABLET ORAL at 05:32

## 2024-09-15 RX ADMIN — FAMOTIDINE 40 MILLIGRAM(S): 10 INJECTION INTRAVENOUS at 21:08

## 2024-09-15 RX ADMIN — HYDROMORPHONE HYDROCHLORIDE 1 MILLIGRAM(S): 2 TABLET ORAL at 21:07

## 2024-09-15 RX ADMIN — HYDROMORPHONE HYDROCHLORIDE 1 MILLIGRAM(S): 2 TABLET ORAL at 22:07

## 2024-09-15 RX ADMIN — HYDROMORPHONE HYDROCHLORIDE 1 MILLIGRAM(S): 2 TABLET ORAL at 05:24

## 2024-09-15 RX ADMIN — SODIUM PHOSPHATE, DIBASIC, ANHYDROUS, POTASSIUM PHOSPHATE, MONOBASIC, AND SODIUM PHOSPHATE, MONOBASIC, MONOHYDRATE 1 PACKET(S): 852; 155; 130 TABLET, COATED ORAL at 13:49

## 2024-09-15 RX ADMIN — Medication 40 MILLIGRAM(S): at 05:30

## 2024-09-15 RX ADMIN — HYDROMORPHONE HYDROCHLORIDE 1 MILLIGRAM(S): 2 TABLET ORAL at 06:24

## 2024-09-15 RX ADMIN — ENOXAPARIN SODIUM 40 MILLIGRAM(S): 100 INJECTION SUBCUTANEOUS at 08:00

## 2024-09-15 RX ADMIN — ONDANSETRON 4 MILLIGRAM(S): 2 INJECTION, SOLUTION INTRAMUSCULAR; INTRAVENOUS at 20:18

## 2024-09-15 RX ADMIN — HYDROMORPHONE HYDROCHLORIDE 1 MILLIGRAM(S): 2 TABLET ORAL at 00:16

## 2024-09-15 RX ADMIN — HYDROMORPHONE HYDROCHLORIDE 1 MILLIGRAM(S): 2 TABLET ORAL at 10:45

## 2024-09-15 NOTE — PROGRESS NOTE ADULT - SUBJECTIVE AND OBJECTIVE BOX
Date of service: 09-15-24 @ 19:02      Patient is a 49y old  Male who presents with a chief complaint of Pancreatitis (14 Sep 2024 23:31)                                                               INTERVAL HPI/OVERNIGHT EVENTS:    REVIEW OF SYSTEMS:   Still with abdominal pain however compared to previously It says improved  No nausea or vomiting but with constipation                                                                                                                                                                                                                                                             Medications:  MEDICATIONS  (STANDING):  amLODIPine   Tablet 5 milliGRAM(s) Oral daily  enoxaparin Injectable 40 milliGRAM(s) SubCutaneous every 24 hours  escitalopram 20 milliGRAM(s) Oral daily  famotidine    Tablet 40 milliGRAM(s) Oral at bedtime  influenza   Vaccine 0.5 milliLiter(s) IntraMuscular once  lactated ringers. 1000 milliLiter(s) (150 mL/Hr) IV Continuous <Continuous>  pantoprazole  Injectable 40 milliGRAM(s) IV Push every 12 hours  senna 2 Tablet(s) Oral at bedtime    MEDICATIONS  (PRN):  acetaminophen     Tablet .. 650 milliGRAM(s) Oral every 6 hours PRN Mild Pain (1 - 3)  aluminum hydroxide/magnesium hydroxide/simethicone Suspension 30 milliLiter(s) Oral every 4 hours PRN Dyspepsia  HYDROmorphone  Injectable 1 milliGRAM(s) IV Push every 4 hours PRN Severe Pain (7 - 10)  ondansetron Injectable 4 milliGRAM(s) IV Push every 8 hours PRN Nausea and/or Vomiting  polyethylene glycol 3350 17 Gram(s) Oral daily PRN Constipation       Allergies    No Known Allergies    Intolerances      Vital Signs Last 24 Hrs  T(C): 37.5 (15 Sep 2024 17:27), Max: 37.5 (15 Sep 2024 17:27)  T(F): 99.5 (15 Sep 2024 17:27), Max: 99.5 (15 Sep 2024 17:27)  HR: 62 (15 Sep 2024 17:45) (55 - 65)  BP: 138/72 (15 Sep 2024 17:45) (119/73 - 140/75)  BP(mean): --  RR: 16 (15 Sep 2024 17:45) (16 - 18)  SpO2: 99% (15 Sep 2024 17:45) (99% - 100%)    Parameters below as of 15 Sep 2024 17:45  Patient On (Oxygen Delivery Method): room air      CAPILLARY BLOOD GLUCOSE          Physical Exam:    Daily     Daily   General:  Well appearing, NAD, not cachetic  HEENT:  Nonicteric, PERRLA  CV:  RRR, S1S2   Lungs:  CTA B/L, no wheezes, rales, rhonchi  Abdomen:  Soft, Tenderness to deep palpation  Extremities:  2+ pulses, no c/c, no edema  Skin:  Warm and dry, no rashes  :  No temple  Neuro:  AAOx3, non-focal, grossly intact                                                                                                                                                                                                                                                                                                LABS:                               12.5   14.13 )-----------( 173      ( 15 Sep 2024 05:44 )             35.9                      09-15    139  |  102  |  7   ----------------------------<  75  3.4<L>   |  22  |  0.62    Ca    8.5      15 Sep 2024 05:44  Phos  2.2     09-15  Mg     1.90     09-15    TPro  6.1  /  Alb  3.7  /  TBili  0.8  /  DBili  x   /  AST  15  /  ALT  8   /  AlkPhos  60  09-14                       RADIOLOGY & ADDITIONAL TESTS         I personally reviewed: [  ]EKG   [  ]CXR    [  ] CT      A/P:         Discussed with :     [facility] consultants' Notes   Time spent :

## 2024-09-15 NOTE — PROGRESS NOTE ADULT - ASSESSMENT
49M PMHx gastritis, HTN who presents with 1 day of abdominal pain found to have acute pancreatitis.       Acute Pancreatitis.   ·  Plan: Etiology unclear.  No alcohol use, no history of gallstones, no recent abdominal procedures. TGs wnl  - pain control, anti-emetics prn  MRCP: *  Acute interstitial edematous pancreatitis. No evidence of pancreatic   necrosis or drainable peripancreatic fluid collection.  *  No evidence of choledocholithiasis or biliary ductal dilatation.    Continue current management IV fluids and pain medication  advance diet as tolerates     ·  Problem: Hypertension.   ·  Plan: - c/w amlodipine.    Gastritis.   ·  Plan: - maalox prn  - outpatient GI follow up.

## 2024-09-16 LAB
ANION GAP SERPL CALC-SCNC: 12 MMOL/L — SIGNIFICANT CHANGE UP (ref 7–14)
BUN SERPL-MCNC: 6 MG/DL — LOW (ref 7–23)
CALCIUM SERPL-MCNC: 7.8 MG/DL — LOW (ref 8.4–10.5)
CHLORIDE SERPL-SCNC: 102 MMOL/L — SIGNIFICANT CHANGE UP (ref 98–107)
CO2 SERPL-SCNC: 23 MMOL/L — SIGNIFICANT CHANGE UP (ref 22–31)
CREAT SERPL-MCNC: 0.61 MG/DL — SIGNIFICANT CHANGE UP (ref 0.5–1.3)
EGFR: 118 ML/MIN/1.73M2 — SIGNIFICANT CHANGE UP
GLUCOSE SERPL-MCNC: 82 MG/DL — SIGNIFICANT CHANGE UP (ref 70–99)
HCT VFR BLD CALC: 34.4 % — LOW (ref 39–50)
HGB BLD-MCNC: 12.2 G/DL — LOW (ref 13–17)
IGG4 SER-MCNC: 14.7 MG/DL — SIGNIFICANT CHANGE UP (ref 1–123)
MAGNESIUM SERPL-MCNC: 1.9 MG/DL — SIGNIFICANT CHANGE UP (ref 1.6–2.6)
MCHC RBC-ENTMCNC: 31.2 PG — SIGNIFICANT CHANGE UP (ref 27–34)
MCHC RBC-ENTMCNC: 35.5 GM/DL — SIGNIFICANT CHANGE UP (ref 32–36)
MCV RBC AUTO: 88 FL — SIGNIFICANT CHANGE UP (ref 80–100)
NRBC # BLD: 0 /100 WBCS — SIGNIFICANT CHANGE UP (ref 0–0)
NRBC # FLD: 0 K/UL — SIGNIFICANT CHANGE UP (ref 0–0)
PHOSPHATE SERPL-MCNC: 2.5 MG/DL — SIGNIFICANT CHANGE UP (ref 2.5–4.5)
PLATELET # BLD AUTO: 174 K/UL — SIGNIFICANT CHANGE UP (ref 150–400)
POTASSIUM SERPL-MCNC: 3.3 MMOL/L — LOW (ref 3.5–5.3)
POTASSIUM SERPL-SCNC: 3.3 MMOL/L — LOW (ref 3.5–5.3)
RBC # BLD: 3.91 M/UL — LOW (ref 4.2–5.8)
RBC # FLD: 12.5 % — SIGNIFICANT CHANGE UP (ref 10.3–14.5)
SODIUM SERPL-SCNC: 137 MMOL/L — SIGNIFICANT CHANGE UP (ref 135–145)
WBC # BLD: 13.62 K/UL — HIGH (ref 3.8–10.5)
WBC # FLD AUTO: 13.62 K/UL — HIGH (ref 3.8–10.5)

## 2024-09-16 PROCEDURE — 99231 SBSQ HOSP IP/OBS SF/LOW 25: CPT

## 2024-09-16 RX ORDER — OXYCODONE HYDROCHLORIDE 5 MG/1
5 TABLET ORAL EVERY 6 HOURS
Refills: 0 | Status: DISCONTINUED | OUTPATIENT
Start: 2024-09-16 | End: 2024-09-17

## 2024-09-16 RX ORDER — POTASSIUM CHLORIDE 10 MEQ
10 TABLET, EXT RELEASE, PARTICLES/CRYSTALS ORAL
Refills: 0 | Status: COMPLETED | OUTPATIENT
Start: 2024-09-16 | End: 2024-09-16

## 2024-09-16 RX ORDER — KETOROLAC TROMETHAMINE 30 MG/ML
15 INJECTION, SOLUTION INTRAMUSCULAR ONCE
Refills: 0 | Status: DISCONTINUED | OUTPATIENT
Start: 2024-09-16 | End: 2024-09-16

## 2024-09-16 RX ORDER — PANTOPRAZOLE SODIUM 40 MG
40 TABLET, DELAYED RELEASE (ENTERIC COATED) ORAL
Refills: 0 | Status: DISCONTINUED | OUTPATIENT
Start: 2024-09-17 | End: 2024-09-17

## 2024-09-16 RX ORDER — POTASSIUM CHLORIDE 10 MEQ
40 TABLET, EXT RELEASE, PARTICLES/CRYSTALS ORAL ONCE
Refills: 0 | Status: COMPLETED | OUTPATIENT
Start: 2024-09-16 | End: 2024-09-16

## 2024-09-16 RX ADMIN — AMLODIPINE BESYLATE 5 MILLIGRAM(S): 10 TABLET ORAL at 05:11

## 2024-09-16 RX ADMIN — HYDROMORPHONE HYDROCHLORIDE 1 MILLIGRAM(S): 2 TABLET ORAL at 13:25

## 2024-09-16 RX ADMIN — Medication 150 MILLILITER(S): at 21:27

## 2024-09-16 RX ADMIN — HYDROMORPHONE HYDROCHLORIDE 1 MILLIGRAM(S): 2 TABLET ORAL at 09:23

## 2024-09-16 RX ADMIN — ENOXAPARIN SODIUM 40 MILLIGRAM(S): 100 INJECTION SUBCUTANEOUS at 08:05

## 2024-09-16 RX ADMIN — KETOROLAC TROMETHAMINE 15 MILLIGRAM(S): 30 INJECTION, SOLUTION INTRAMUSCULAR at 21:03

## 2024-09-16 RX ADMIN — HYDROMORPHONE HYDROCHLORIDE 1 MILLIGRAM(S): 2 TABLET ORAL at 10:23

## 2024-09-16 RX ADMIN — HYDROMORPHONE HYDROCHLORIDE 1 MILLIGRAM(S): 2 TABLET ORAL at 05:10

## 2024-09-16 RX ADMIN — Medication 150 MILLILITER(S): at 14:52

## 2024-09-16 RX ADMIN — Medication 40 MILLIEQUIVALENT(S): at 17:33

## 2024-09-16 RX ADMIN — Medication 40 MILLIGRAM(S): at 05:11

## 2024-09-16 RX ADMIN — Medication 40 MILLIGRAM(S): at 17:25

## 2024-09-16 RX ADMIN — ESCITALOPRAM OXALATE 20 MILLIGRAM(S): 10 TABLET ORAL at 11:40

## 2024-09-16 RX ADMIN — HYDROMORPHONE HYDROCHLORIDE 1 MILLIGRAM(S): 2 TABLET ORAL at 14:25

## 2024-09-16 RX ADMIN — KETOROLAC TROMETHAMINE 15 MILLIGRAM(S): 30 INJECTION, SOLUTION INTRAMUSCULAR at 22:03

## 2024-09-16 RX ADMIN — HYDROMORPHONE HYDROCHLORIDE 1 MILLIGRAM(S): 2 TABLET ORAL at 06:10

## 2024-09-16 RX ADMIN — Medication 100 MILLIEQUIVALENT(S): at 09:23

## 2024-09-16 RX ADMIN — Medication 100 MILLIEQUIVALENT(S): at 07:36

## 2024-09-16 RX ADMIN — HYDROMORPHONE HYDROCHLORIDE 1 MILLIGRAM(S): 2 TABLET ORAL at 01:07

## 2024-09-16 RX ADMIN — OXYCODONE HYDROCHLORIDE 5 MILLIGRAM(S): 5 TABLET ORAL at 17:33

## 2024-09-16 RX ADMIN — Medication 100 MILLIEQUIVALENT(S): at 11:40

## 2024-09-16 RX ADMIN — HYDROMORPHONE HYDROCHLORIDE 1 MILLIGRAM(S): 2 TABLET ORAL at 02:07

## 2024-09-16 RX ADMIN — FAMOTIDINE 40 MILLIGRAM(S): 10 INJECTION INTRAVENOUS at 21:26

## 2024-09-16 NOTE — PROGRESS NOTE ADULT - SUBJECTIVE AND OBJECTIVE BOX
Interval Events: mrcp without necrosis, fluid collection or cbd stone. requiring dilaudid frequently per mar. pt seen and examined.      Allergies:  No Known Allergies      Hospital Medications:  acetaminophen     Tablet .. 650 milliGRAM(s) Oral every 6 hours PRN  aluminum hydroxide/magnesium hydroxide/simethicone Suspension 30 milliLiter(s) Oral every 4 hours PRN  amLODIPine   Tablet 5 milliGRAM(s) Oral daily  enoxaparin Injectable 40 milliGRAM(s) SubCutaneous every 24 hours  escitalopram 20 milliGRAM(s) Oral daily  famotidine    Tablet 40 milliGRAM(s) Oral at bedtime  HYDROmorphone  Injectable 1 milliGRAM(s) IV Push every 4 hours PRN  influenza   Vaccine 0.5 milliLiter(s) IntraMuscular once  lactated ringers. 1000 milliLiter(s) IV Continuous <Continuous>  ondansetron Injectable 4 milliGRAM(s) IV Push every 8 hours PRN  pantoprazole  Injectable 40 milliGRAM(s) IV Push every 12 hours  polyethylene glycol 3350 17 Gram(s) Oral daily PRN  potassium chloride  10 mEq/100 mL IVPB 10 milliEquivalent(s) IV Intermittent every 1 hour  senna 2 Tablet(s) Oral at bedtime      ROS: As per HPI, otherwise 10-point ROS reviewed and negative.    Vital Signs:  Vital Signs Last 24 Hrs  T(C): 37.1 (16 Sep 2024 05:29), Max: 37.5 (15 Sep 2024 17:27)  T(F): 98.8 (16 Sep 2024 05:29), Max: 99.5 (15 Sep 2024 17:27)  HR: 61 (16 Sep 2024 05:29) (55 - 81)  BP: 129/78 (16 Sep 2024 05:29) (119/73 - 140/75)  BP(mean): --  RR: 18 (16 Sep 2024 05:29) (16 - 18)  SpO2: 99% (16 Sep 2024 05:29) (98% - 100%)    Parameters below as of 16 Sep 2024 05:29  Patient On (Oxygen Delivery Method): room air      Daily     Daily         LABS:                        12.2   13.62 )-----------( 174      ( 16 Sep 2024 05:32 )             34.4     Mean Cell Volume: 88.0 fL (09-16-24 @ 05:32)    09-16    137  |  102  |  6<L>  ----------------------------<  82  3.3<L>   |  23  |  0.61    Ca    7.8<L>      16 Sep 2024 05:32  Phos  2.5     09-16  Mg     1.90     09-16          Urinalysis Basic - ( 16 Sep 2024 05:32 )    Color: x / Appearance: x / SG: x / pH: x  Gluc: 82 mg/dL / Ketone: x  / Bili: x / Urobili: x   Blood: x / Protein: x / Nitrite: x   Leuk Esterase: x / RBC: x / WBC x   Sq Epi: x / Non Sq Epi: x / Bacteria: x                              12.2   13.62 )-----------( 174      ( 16 Sep 2024 05:32 )             34.4                         12.5   14.13 )-----------( 173      ( 15 Sep 2024 05:44 )             35.9                         13.0   16.13 )-----------( 166      ( 14 Sep 2024 06:30 )             37.3       PHYSICAL EXAM  GENERAL:  Lying in bed in NAD  HEENT:  NCAT, no scleral icterus  NECK: Trachea midline  CHEST:  Resp even, non labored  ABDOMEN:  Soft, non-tender, non-distended  EXTREMITIES:  WWP, no edema  SKIN:  No visible rash or jaundice  NEURO:  Alert and oriented x 3, no asterixis    Imaging:    ACC: [id number] EXAM:  MR MRCP WAW    ORDERED BY: KANNAN LOZANO     PROCEDURE DATE:  09/13/2024          INTERPRETATION:  CLINICAL INFORMATION: Acute pancreatitis.    COMPARISON: CT scan 9/11/2024    CONTRAST/COMPLICATIONS:  IV Contrast: Gadavist  10 cc administered   0 cc discarded  Oral Contrast: NONE  Complications: None reported at time of study completion    PROCEDURE:  MRI of the abdomen was performed.  MRCP was performed.    FINDINGS:  LOWER CHEST: Within normal limits.    LIVER: Few tiny subcentimeter cysts.  BILE DUCTS: Normal caliber. No choledocholithiasis.  GALLBLADDER: Gallbladder wall thickening, likely reactive.  SPLEEN: Within normal limits.  PANCREAS: Restricted diffusion in the pancreatic head with mild   peripancreatic signal abnormality, consistent with acute interstitial   edematous pancreatitis. Pancreatic duct is normal in caliber. No evidence   of drainable fluid collection or pancreatic necrosis.  ADRENALS: Within normal limits.  KIDNEYS/URETERS: Bilateral subcentimeter renal cysts. No hydronephrosis.    VISUALIZED PORTIONS:  BOWEL: Within normal limits. Appendix is normal.  PERITONEUM: No ascites.  VESSELS: Mild atheromatous changes.  RETROPERITONEUM/LYMPH NODES: No lymphadenopathy.  ABDOMINAL WALL: Small amount of partially visualized fluid in the pelvis.  BONES: Within normal limits.    IMPRESSION:  *  Acute interstitial edematous pancreatitis. No evidence of pancreatic   necrosis or drainable peripancreatic fluid collection.  *  No evidence of choledocholithiasis or biliary ductal dilatation.        --- End of Report ---           Interval Events: mrcp without necrosis, fluid collection or cbd stone. requiring dilaudid frequently per mar. pt seen and examined. still w/ pain but overall improved. no n/v. no bm.      Allergies:  No Known Allergies      Hospital Medications:  acetaminophen     Tablet .. 650 milliGRAM(s) Oral every 6 hours PRN  aluminum hydroxide/magnesium hydroxide/simethicone Suspension 30 milliLiter(s) Oral every 4 hours PRN  amLODIPine   Tablet 5 milliGRAM(s) Oral daily  enoxaparin Injectable 40 milliGRAM(s) SubCutaneous every 24 hours  escitalopram 20 milliGRAM(s) Oral daily  famotidine    Tablet 40 milliGRAM(s) Oral at bedtime  HYDROmorphone  Injectable 1 milliGRAM(s) IV Push every 4 hours PRN  influenza   Vaccine 0.5 milliLiter(s) IntraMuscular once  lactated ringers. 1000 milliLiter(s) IV Continuous <Continuous>  ondansetron Injectable 4 milliGRAM(s) IV Push every 8 hours PRN  pantoprazole  Injectable 40 milliGRAM(s) IV Push every 12 hours  polyethylene glycol 3350 17 Gram(s) Oral daily PRN  potassium chloride  10 mEq/100 mL IVPB 10 milliEquivalent(s) IV Intermittent every 1 hour  senna 2 Tablet(s) Oral at bedtime      ROS: As per HPI, otherwise 10-point ROS reviewed and negative.    Vital Signs:  Vital Signs Last 24 Hrs  T(C): 37.1 (16 Sep 2024 05:29), Max: 37.5 (15 Sep 2024 17:27)  T(F): 98.8 (16 Sep 2024 05:29), Max: 99.5 (15 Sep 2024 17:27)  HR: 61 (16 Sep 2024 05:29) (55 - 81)  BP: 129/78 (16 Sep 2024 05:29) (119/73 - 140/75)  BP(mean): --  RR: 18 (16 Sep 2024 05:29) (16 - 18)  SpO2: 99% (16 Sep 2024 05:29) (98% - 100%)    Parameters below as of 16 Sep 2024 05:29  Patient On (Oxygen Delivery Method): room air      Daily     Daily         LABS:                        12.2   13.62 )-----------( 174      ( 16 Sep 2024 05:32 )             34.4     Mean Cell Volume: 88.0 fL (09-16-24 @ 05:32)    09-16    137  |  102  |  6<L>  ----------------------------<  82  3.3<L>   |  23  |  0.61    Ca    7.8<L>      16 Sep 2024 05:32  Phos  2.5     09-16  Mg     1.90     09-16          Urinalysis Basic - ( 16 Sep 2024 05:32 )    Color: x / Appearance: x / SG: x / pH: x  Gluc: 82 mg/dL / Ketone: x  / Bili: x / Urobili: x   Blood: x / Protein: x / Nitrite: x   Leuk Esterase: x / RBC: x / WBC x   Sq Epi: x / Non Sq Epi: x / Bacteria: x                              12.2   13.62 )-----------( 174      ( 16 Sep 2024 05:32 )             34.4                         12.5   14.13 )-----------( 173      ( 15 Sep 2024 05:44 )             35.9                         13.0   16.13 )-----------( 166      ( 14 Sep 2024 06:30 )             37.3       PHYSICAL EXAM  GENERAL: lying in bed, in no apparent distress  HEENT: NCAT  EYES: anicteric sclerae  NECK: trachea midline, FROM  CHEST:  respirations even, non labored  ABDOMEN:  soft, minimal epigastric ttp, nd  EXTREMITIES: WWP, no edema  SKIN:  warm/dry, no visible rash appreciated  PSYCH: appropriate affect, A&O x 3  NEURO: no tremor noted       Imaging:    ACC: [id number] EXAM:  MR MRCP WAW IC   ORDERED BY: KANNAN LOZANO     PROCEDURE DATE:  09/13/2024          INTERPRETATION:  CLINICAL INFORMATION: Acute pancreatitis.    COMPARISON: CT scan 9/11/2024    CONTRAST/COMPLICATIONS:  IV Contrast: Gadavist  10 cc administered   0 cc discarded  Oral Contrast: NONE  Complications: None reported at time of study completion    PROCEDURE:  MRI of the abdomen was performed.  MRCP was performed.    FINDINGS:  LOWER CHEST: Within normal limits.    LIVER: Few tiny subcentimeter cysts.  BILE DUCTS: Normal caliber. No choledocholithiasis.  GALLBLADDER: Gallbladder wall thickening, likely reactive.  SPLEEN: Within normal limits.  PANCREAS: Restricted diffusion in the pancreatic head with mild   peripancreatic signal abnormality, consistent with acute interstitial   edematous pancreatitis. Pancreatic duct is normal in caliber. No evidence   of drainable fluid collection or pancreatic necrosis.  ADRENALS: Within normal limits.  KIDNEYS/URETERS: Bilateral subcentimeter renal cysts. No hydronephrosis.    VISUALIZED PORTIONS:  BOWEL: Within normal limits. Appendix is normal.  PERITONEUM: No ascites.  VESSELS: Mild atheromatous changes.  RETROPERITONEUM/LYMPH NODES: No lymphadenopathy.  ABDOMINAL WALL: Small amount of partially visualized fluid in the pelvis.  BONES: Within normal limits.    IMPRESSION:  *  Acute interstitial edematous pancreatitis. No evidence of pancreatic   necrosis or drainable peripancreatic fluid collection.  *  No evidence of choledocholithiasis or biliary ductal dilatation.        --- End of Report ---

## 2024-09-16 NOTE — PROGRESS NOTE ADULT - ASSESSMENT
49M PMHx gastritis, HTN who presents with abdominal pain. CTA AP showing diffuse peripancreatic fat stranding/fluid suggesting acute pancreatitis, no peripancreatic collection or evidence of parenchymal necrosis.    # acute pancreatitis - likely idiopathic  # acute abdominal pain  - p/w acute epigastric abd pain x 1 day with associated nausea and non bloody vomiting with imaging as above, no gs on CT, no hx of etoh use, tg wnl, no recent viral illnesses or new meds, fhx n/c  - mrcp without necrosis, fluid collection or cbd stone  # hx of chronic upper gi complaints sp egd last yr reportedly unrevealing    Recommendations-  - f/u igg4  - cont PPI, pepcid, maalox prn  - bowel regimen w/ miralax daily  - transition to po pain regimen as feasible  - advance diet as tolerated (low fat)  - pt should f/u with his primary GI upon dc  - rest of care as per primary team    *all recommendations are tentative until note is attested by attending*  GI to sign off, reach out with questions/concerns    ARMANDO Hernandez PA-C, RD, CDN  available on TEAMS M/T/TH/F from 7am - 4pm    after hours/weekends: non urgent issues --> email [email or web address], urgent issues --> contact on-call GI fellow at [phone]    49M PMHx gastritis, HTN who presents with abdominal pain. CTA AP showing diffuse peripancreatic fat stranding/fluid suggesting acute pancreatitis, no peripancreatic collection or evidence of parenchymal necrosis.    # acute pancreatitis - likely idiopathic  # acute abdominal pain  - p/w acute epigastric abd pain x 1 day with associated nausea and non bloody vomiting with imaging as above, no gs on CT, no hx of etoh use, tg wnl, no recent viral illnesses or new meds, fhx n/c  - mrcp without necrosis, fluid collection or cbd stone, clinically improving  # hx of chronic upper gi complaints sp egd last yr reportedly unrevealing    Recommendations-  - f/u igg4  - cont PPI, pepcid, maalox prn  - bowel regimen w/ miralax daily  - transition to po pain regimen as feasible  - advance diet as tolerated (low fat)  - pt should f/u with his primary GI upon dc  - rest of care as per primary team    *all recommendations are tentative until note is attested by attending*  GI to sign off, reach out with questions/concerns    ARMANDO Hernandez PA-C, RD, CDN  available on TEAMS M/T/TH/F from 7am - 4pm    after hours/weekends: non urgent issues --> email [email or web address], urgent issues --> contact on-call GI fellow at [phone]    49M PMHx gastritis, HTN who presents with abdominal pain. CTA AP showing diffuse peripancreatic fat stranding/fluid suggesting acute pancreatitis, no peripancreatic collection or evidence of parenchymal necrosis.    # acute pancreatitis - likely idiopathic vs sludge  # acute abdominal pain  - p/w acute epigastric abd pain x 1 day with associated nausea and non bloody vomiting with imaging as above, no gs on CT, no hx of etoh use, tg wnl, no recent viral illnesses or new meds, fhx n/c  - mrcp without necrosis, fluid collection or cbd stone, clinically improving  # hx of chronic upper gi complaints sp egd last yr reportedly unrevealing    Recommendations-  - f/u igg4  - cont PPI, pepcid, maalox prn  - bowel regimen w/ miralax daily  - transition to po pain regimen as feasible  - advance diet as tolerated (low fat)  - pt should f/u with his primary GI upon dc  - rest of care as per primary team    *all recommendations are tentative until note is attested by attending*  GI to sign off, reach out with questions/concerns    ARMANDO Hernandez PA-C, RD, CDN  available on TEAMS M/T/TH/F from 7am - 4pm    after hours/weekends: non urgent issues --> email [email or web address], urgent issues --> contact on-call GI fellow at [phone]    49M PMHx gastritis, HTN who presents with abdominal pain. CTA AP showing diffuse peripancreatic fat stranding/fluid suggesting acute pancreatitis, no peripancreatic collection or evidence of parenchymal necrosis.    # acute pancreatitis - likely idiopathic vs sludge induced  # acute abdominal pain  - p/w acute epigastric abd pain x 1 day with associated nausea and non bloody vomiting with imaging as above, no gs on CT, no hx of etoh use, tg wnl, no recent viral illnesses or new meds, fhx n/c  - mrcp without necrosis, fluid collection or cbd stone, clinically improving  # hx of chronic upper gi complaints sp egd last yr reportedly unrevealing    Recommendations-  - f/u igg4  - cont PPI, pepcid, maalox prn  - bowel regimen w/ miralax daily  - transition to po pain regimen as feasible  - advance diet as tolerated (low fat)  - pt should f/u with his primary GI upon dc  - rest of care as per primary team    *all recommendations are tentative until note is attested by attending*  GI to sign off, reach out with questions/concerns    ARMANDO Hernandez PA-C, RD, CDN  available on TEAMS M/T/TH/F from 7am - 4pm    after hours/weekends: non urgent issues --> email [email or web address], urgent issues --> contact on-call GI fellow at [phone]

## 2024-09-16 NOTE — PROGRESS NOTE ADULT - SUBJECTIVE AND OBJECTIVE BOX
Date of service: 09-17-24 @ 06:24      Patient is a 49y old  Male who presents with a chief complaint of Pancreatitis (16 Sep 2024 07:04)                                                               INTERVAL HPI/OVERNIGHT EVENTS:    REVIEW OF SYSTEMS:     CONSTITUTIONAL: No weakness, fevers or chills  EYES/ENT: No visual changes , no ear ache   NECK: No pain or stiffness  RESPIRATORY: No cough, wheezing,  No shortness of breath  CARDIOVASCULAR: No chest pain or palpitations  GASTROINTESTINAL: No abdominal pain  . No nausea, vomiting, or hematemesis; No diarrhea or constipation. No melena or hematochezia.  GENITOURINARY: No dysuria, frequency or hematuria  NEUROLOGICAL: No numbness or weakness  SKIN: No itching, burning, rashes, or lesions                                                                                                                                                                                                                                                                                 Medications:  MEDICATIONS  (STANDING):  amLODIPine   Tablet 5 milliGRAM(s) Oral daily  enoxaparin Injectable 40 milliGRAM(s) SubCutaneous every 24 hours  escitalopram 20 milliGRAM(s) Oral daily  famotidine    Tablet 40 milliGRAM(s) Oral at bedtime  influenza   Vaccine 0.5 milliLiter(s) IntraMuscular once  lactated ringers. 1000 milliLiter(s) (150 mL/Hr) IV Continuous <Continuous>  pantoprazole    Tablet 40 milliGRAM(s) Oral before breakfast  senna 2 Tablet(s) Oral at bedtime    MEDICATIONS  (PRN):  acetaminophen     Tablet .. 650 milliGRAM(s) Oral every 6 hours PRN Mild Pain (1 - 3)  aluminum hydroxide/magnesium hydroxide/simethicone Suspension 30 milliLiter(s) Oral every 4 hours PRN Dyspepsia  ondansetron Injectable 4 milliGRAM(s) IV Push every 8 hours PRN Nausea and/or Vomiting  oxyCODONE    IR 5 milliGRAM(s) Oral every 6 hours PRN Moderate & severe Pain  polyethylene glycol 3350 17 Gram(s) Oral daily PRN Constipation       Allergies    No Known Allergies    Intolerances      Vital Signs Last 24 Hrs  T(C): 37.4 (17 Sep 2024 05:48), Max: 37.4 (16 Sep 2024 21:03)  T(F): 99.3 (17 Sep 2024 05:48), Max: 99.4 (16 Sep 2024 21:03)  HR: 57 (17 Sep 2024 05:48) (57 - 60)  BP: 136/73 (17 Sep 2024 05:48) (115/64 - 136/74)  BP(mean): --  RR: 16 (17 Sep 2024 05:48) (16 - 18)  SpO2: 100% (17 Sep 2024 05:48) (95% - 100%)    Parameters below as of 17 Sep 2024 05:48  Patient On (Oxygen Delivery Method): room air      CAPILLARY BLOOD GLUCOSE          Physical Exam:    Daily     Daily   General:  Well appearing, NAD, not cachetic  HEENT:  Nonicteric, PERRLA  CV:  RRR, S1S2   Lungs:  CTA B/L, no wheezes, rales, rhonchi  Abdomen:  Soft, non-tender, no distended, positive BS  Extremities:  2+ pulses, no c/c, no edema  Skin:  Warm and dry, no rashes  :  No temple  Neuro:  AAOx3, non-focal, grossly intact                                                                                                                                                                                                                                                                                                LABS:                               12.2   13.62 )-----------( 174      ( 16 Sep 2024 05:32 )             34.4                      09-16    137  |  102  |  6<L>  ----------------------------<  82  3.3<L>   |  23  |  0.61    Ca    7.8<L>      16 Sep 2024 05:32  Phos  2.5     09-16  Mg     1.90     09-16                         RADIOLOGY & ADDITIONAL TESTS         I personally reviewed: [  ]EKG   [  ]CXR    [  ] CT      A/P:         Discussed with :     [facility] consultants' Notes   Time spent :   Date of service: 09-17-24 @ 06:24      Patient is a 49y old  Male who presents with a chief complaint of Pancreatitis (16 Sep 2024 07:04)                                                               INTERVAL HPI/OVERNIGHT EVENTS:    REVIEW OF SYSTEMS:   Reports improving abdominal pain..Still requiring IV Dilaudid times  No nausea or vomiting                                                                                                                                                                                                                                                                            Medications:  MEDICATIONS  (STANDING):  amLODIPine   Tablet 5 milliGRAM(s) Oral daily  enoxaparin Injectable 40 milliGRAM(s) SubCutaneous every 24 hours  escitalopram 20 milliGRAM(s) Oral daily  famotidine    Tablet 40 milliGRAM(s) Oral at bedtime  influenza   Vaccine 0.5 milliLiter(s) IntraMuscular once  lactated ringers. 1000 milliLiter(s) (150 mL/Hr) IV Continuous <Continuous>  pantoprazole    Tablet 40 milliGRAM(s) Oral before breakfast  senna 2 Tablet(s) Oral at bedtime    MEDICATIONS  (PRN):  acetaminophen     Tablet .. 650 milliGRAM(s) Oral every 6 hours PRN Mild Pain (1 - 3)  aluminum hydroxide/magnesium hydroxide/simethicone Suspension 30 milliLiter(s) Oral every 4 hours PRN Dyspepsia  ondansetron Injectable 4 milliGRAM(s) IV Push every 8 hours PRN Nausea and/or Vomiting  oxyCODONE    IR 5 milliGRAM(s) Oral every 6 hours PRN Moderate & severe Pain  polyethylene glycol 3350 17 Gram(s) Oral daily PRN Constipation       Allergies    No Known Allergies    Intolerances      Vital Signs Last 24 Hrs  T(C): 37.4 (17 Sep 2024 05:48), Max: 37.4 (16 Sep 2024 21:03)  T(F): 99.3 (17 Sep 2024 05:48), Max: 99.4 (16 Sep 2024 21:03)  HR: 57 (17 Sep 2024 05:48) (57 - 60)  BP: 136/73 (17 Sep 2024 05:48) (115/64 - 136/74)  BP(mean): --  RR: 16 (17 Sep 2024 05:48) (16 - 18)  SpO2: 100% (17 Sep 2024 05:48) (95% - 100%)    Parameters below as of 17 Sep 2024 05:48  Patient On (Oxygen Delivery Method): room air      CAPILLARY BLOOD GLUCOSE          Physical Exam:    Daily     Daily   General:  Well appearing, NAD, not cachetic  HEENT:  Nonicteric, PERRLA  CV:  RRR, S1S2   Lungs:  CTA B/L, no wheezes, rales, rhonchi  Abdomen:  Soft, non-tender, no distended, positive BS  Extremities:  2+ pulses, no c/c, no edema  Skin:  Warm and dry, no rashes  :  No temple  Neuro:  AAOx3, non-focal, grossly intact                                                                                                                                                                                                                                                                                                LABS:                               12.2   13.62 )-----------( 174      ( 16 Sep 2024 05:32 )             34.4                      09-16    137  |  102  |  6<L>  ----------------------------<  82  3.3<L>   |  23  |  0.61    Ca    7.8<L>      16 Sep 2024 05:32  Phos  2.5     09-16  Mg     1.90     09-16                         RADIOLOGY & ADDITIONAL TESTS         I personally reviewed: [  ]EKG   [  ]CXR    [  ] CT      A/P:         Discussed with :     [facility] consultants' Notes   Time spent :

## 2024-09-16 NOTE — PROGRESS NOTE ADULT - NUTRITIONAL ASSESSMENT
This is a 49M man admitted with his first episode of acute pancreatitis.     # acute pancreatitis - likely idiopathic vs sludge induced  # acute abdominal pain  - p/w acute epigastric abd pain x 1 day with associated nausea and non bloody vomiting with imaging as above, no gs on CT, no hx of etoh use, tg wnl, no recent viral illnesses or new meds, fhx n/c  - mrcp without necrosis, fluid collection or cbd stone, clinically improving  # hx of chronic upper gi complaints sp egd last yr reportedly unrevealing

## 2024-09-16 NOTE — PROGRESS NOTE ADULT - ATTENDING COMMENTS
50yo M with no risk factor who presents with 1st episode of acute pancreatitis. No clear etiology identified. No evidence of end organ damage. Would obtain MRI/MRCP to further evaluate for subtle cholelithiasis vs. malignancy. Pain is improving. Please optimize fluids to 150cc/hr. Encourage PO nutrition.
49M admitted with his first episode of acute pancreatitis. Clinically improving.  Etiology is unclear, but statistically is most likely to be due to gallstone disease even in the absence of visualized gallstones. Recommend: continue advancing diet as tolerated. May wish to discuss referral for future elective cholecystectomy with his outpatient GI once discharged.

## 2024-09-16 NOTE — PROGRESS NOTE ADULT - ASSESSMENT
49M PMHx gastritis, HTN who presents with 1 day of abdominal pain found to have acute pancreatitis.       Acute Pancreatitis.   ·  Plan: Etiology unclear.  No alcohol use, no history of gallstones, no recent abdominal procedures. TGs wnl  - pain control, anti-emetics prn  MRCP: *  Acute interstitial edematous pancreatitis. No evidence of pancreatic   necrosis or drainable peripancreatic fluid collection.  *  No evidence of choledocholithiasis or biliary ductal dilatation.  Pain is improving will attempt to change to p.o. Dilaudid and if pain under reasonable control will discharge home with follow-up as an outpatient  Tolerating p.o.    ·  Problem: Hypertension.   ·  Plan: - c/w amlodipine.    Gastritis.   ·  Plan: - maalox prn  - outpatient GI follow up.

## 2024-09-17 VITALS
RESPIRATION RATE: 18 BRPM | TEMPERATURE: 99 F | HEART RATE: 58 BPM | SYSTOLIC BLOOD PRESSURE: 124 MMHG | DIASTOLIC BLOOD PRESSURE: 75 MMHG | OXYGEN SATURATION: 97 %

## 2024-09-17 LAB
ANION GAP SERPL CALC-SCNC: 14 MMOL/L — SIGNIFICANT CHANGE UP (ref 7–14)
BUN SERPL-MCNC: 7 MG/DL — SIGNIFICANT CHANGE UP (ref 7–23)
CALCIUM SERPL-MCNC: 7.8 MG/DL — LOW (ref 8.4–10.5)
CHLORIDE SERPL-SCNC: 104 MMOL/L — SIGNIFICANT CHANGE UP (ref 98–107)
CO2 SERPL-SCNC: 22 MMOL/L — SIGNIFICANT CHANGE UP (ref 22–31)
CREAT SERPL-MCNC: 0.65 MG/DL — SIGNIFICANT CHANGE UP (ref 0.5–1.3)
EGFR: 116 ML/MIN/1.73M2 — SIGNIFICANT CHANGE UP
GLUCOSE SERPL-MCNC: 80 MG/DL — SIGNIFICANT CHANGE UP (ref 70–99)
HCT VFR BLD CALC: 35.8 % — LOW (ref 39–50)
HGB BLD-MCNC: 12.5 G/DL — LOW (ref 13–17)
MAGNESIUM SERPL-MCNC: 1.9 MG/DL — SIGNIFICANT CHANGE UP (ref 1.6–2.6)
MCHC RBC-ENTMCNC: 30.4 PG — SIGNIFICANT CHANGE UP (ref 27–34)
MCHC RBC-ENTMCNC: 34.9 GM/DL — SIGNIFICANT CHANGE UP (ref 32–36)
MCV RBC AUTO: 87.1 FL — SIGNIFICANT CHANGE UP (ref 80–100)
NRBC # BLD: 0 /100 WBCS — SIGNIFICANT CHANGE UP (ref 0–0)
NRBC # FLD: 0 K/UL — SIGNIFICANT CHANGE UP (ref 0–0)
PHOSPHATE SERPL-MCNC: 2.3 MG/DL — LOW (ref 2.5–4.5)
PLATELET # BLD AUTO: 180 K/UL — SIGNIFICANT CHANGE UP (ref 150–400)
POTASSIUM SERPL-MCNC: 3.5 MMOL/L — SIGNIFICANT CHANGE UP (ref 3.5–5.3)
POTASSIUM SERPL-SCNC: 3.5 MMOL/L — SIGNIFICANT CHANGE UP (ref 3.5–5.3)
RBC # BLD: 4.11 M/UL — LOW (ref 4.2–5.8)
RBC # FLD: 12.8 % — SIGNIFICANT CHANGE UP (ref 10.3–14.5)
SODIUM SERPL-SCNC: 140 MMOL/L — SIGNIFICANT CHANGE UP (ref 135–145)
WBC # BLD: 10.02 K/UL — SIGNIFICANT CHANGE UP (ref 3.8–10.5)
WBC # FLD AUTO: 10.02 K/UL — SIGNIFICANT CHANGE UP (ref 3.8–10.5)

## 2024-09-17 RX ORDER — HYDROMORPHONE HYDROCHLORIDE 2 MG/1
1 TABLET ORAL
Qty: 9 | Refills: 0
Start: 2024-09-17 | End: 2024-09-19

## 2024-09-17 RX ORDER — FAMOTIDINE 10 MG/ML
1 INJECTION INTRAVENOUS
Qty: 30 | Refills: 0
Start: 2024-09-17 | End: 2024-10-16

## 2024-09-17 RX ORDER — ACETAMINOPHEN 325 MG/1
2 TABLET ORAL
Qty: 0 | Refills: 0 | DISCHARGE
Start: 2024-09-17

## 2024-09-17 RX ORDER — IBUPROFEN 600 MG
600 TABLET ORAL ONCE
Refills: 0 | Status: COMPLETED | OUTPATIENT
Start: 2024-09-17 | End: 2024-09-17

## 2024-09-17 RX ORDER — PANTOPRAZOLE SODIUM 40 MG
1 TABLET, DELAYED RELEASE (ENTERIC COATED) ORAL
Qty: 30 | Refills: 0
Start: 2024-09-17 | End: 2024-10-16

## 2024-09-17 RX ORDER — AMLODIPINE BESYLATE 10 MG/1
5 TABLET ORAL
Refills: 0 | DISCHARGE

## 2024-09-17 RX ORDER — SENNA 187 MG
2 TABLET ORAL
Qty: 0 | Refills: 0 | DISCHARGE
Start: 2024-09-17

## 2024-09-17 RX ORDER — AMLODIPINE BESYLATE 10 MG/1
1 TABLET ORAL
Qty: 0 | Refills: 0 | DISCHARGE
Start: 2024-09-17

## 2024-09-17 RX ORDER — HYDROMORPHONE HYDROCHLORIDE 2 MG/1
4 TABLET ORAL EVERY 8 HOURS
Refills: 0 | Status: DISCONTINUED | OUTPATIENT
Start: 2024-09-17 | End: 2024-09-17

## 2024-09-17 RX ORDER — KETOROLAC TROMETHAMINE 30 MG/ML
15 INJECTION, SOLUTION INTRAMUSCULAR ONCE
Refills: 0 | Status: DISCONTINUED | OUTPATIENT
Start: 2024-09-17 | End: 2024-09-17

## 2024-09-17 RX ORDER — HYDROMORPHONE HYDROCHLORIDE 2 MG/1
2 TABLET ORAL EVERY 4 HOURS
Refills: 0 | Status: DISCONTINUED | OUTPATIENT
Start: 2024-09-17 | End: 2024-09-17

## 2024-09-17 RX ORDER — MAGNESIUM, ALUMINUM HYDROXIDE 200-225/5
30 SUSPENSION, ORAL (FINAL DOSE FORM) ORAL
Qty: 0 | Refills: 0 | DISCHARGE
Start: 2024-09-17

## 2024-09-17 RX ADMIN — ESCITALOPRAM OXALATE 20 MILLIGRAM(S): 10 TABLET ORAL at 12:54

## 2024-09-17 RX ADMIN — OXYCODONE HYDROCHLORIDE 5 MILLIGRAM(S): 5 TABLET ORAL at 03:28

## 2024-09-17 RX ADMIN — AMLODIPINE BESYLATE 5 MILLIGRAM(S): 10 TABLET ORAL at 06:00

## 2024-09-17 RX ADMIN — Medication 600 MILLIGRAM(S): at 12:54

## 2024-09-17 RX ADMIN — Medication 40 MILLIGRAM(S): at 06:00

## 2024-09-17 RX ADMIN — ENOXAPARIN SODIUM 40 MILLIGRAM(S): 100 INJECTION SUBCUTANEOUS at 06:00

## 2024-09-17 RX ADMIN — KETOROLAC TROMETHAMINE 15 MILLIGRAM(S): 30 INJECTION, SOLUTION INTRAMUSCULAR at 05:49

## 2024-09-17 RX ADMIN — KETOROLAC TROMETHAMINE 15 MILLIGRAM(S): 30 INJECTION, SOLUTION INTRAMUSCULAR at 06:49

## 2024-09-17 RX ADMIN — Medication 150 MILLILITER(S): at 03:30

## 2024-09-17 RX ADMIN — Medication 150 MILLILITER(S): at 10:03

## 2024-09-17 RX ADMIN — OXYCODONE HYDROCHLORIDE 5 MILLIGRAM(S): 5 TABLET ORAL at 02:28

## 2024-09-17 NOTE — DISCHARGE NOTE PROVIDER - NSDCMRMEDTOKEN_GEN_ALL_CORE_FT
acetaminophen 325 mg oral tablet: 2 tab(s) orally every 6 hours As needed Mild Pain (1 - 3)  aluminum hydroxide-magnesium hydroxide 200 mg-200 mg/5 mL oral suspension: 30 milliliter(s) orally every 4 hours As needed Dyspepsia  amLODIPine 5 mg oral tablet: 1 tab(s) orally once a day  famotidine 40 mg oral tablet: 1 tab(s) orally once a day (at bedtime)  HYDROmorphone 4 mg oral tablet: 1 tab(s) orally every 8 hours as needed for Severe Pain (7 - 10) MDD: 3 tabs /day  Lexapro 20 mg oral tablet: 1 tab(s) orally once a day  pantoprazole 40 mg oral delayed release tablet: 1 tab(s) orally once a day (before a meal)  senna leaf extract oral tablet: 2 tab(s) orally once a day (at bedtime)

## 2024-09-17 NOTE — PROVIDER CONTACT NOTE (OTHER) - REASON
Pt HR is 99.3F and HR is 57. Pt said no rectal temp
pt complaining of pain, can't get pain meds until 21:55
pt complaining of pain, can't get pain meds until 1:10
Pt refused senna

## 2024-09-17 NOTE — DISCHARGE NOTE PROVIDER - NSDCCPCAREPLAN_GEN_ALL_CORE_FT
PRINCIPAL DISCHARGE DIAGNOSIS  Diagnosis: Pancreatitis  Assessment and Plan of Treatment: You were found to have inflammation of Pancrease. MRCP- showed Acute interstitial edematous pancreatitis. No evidence of pancreatic necrosis or drainable peripancreatic fluid collection. No evidence of choledocholithiasis or biliary ductal dilatation  - you were treated with IV fluids, pain control with pain medications  - diet was advanced to regular as tolerated. Follow up with PCP and GI as outpatient for further management      SECONDARY DISCHARGE DIAGNOSES  Diagnosis: Hypertension  Assessment and Plan of Treatment: Continue current blood pressure medication regimen as directed. Monitor for any visual changes, headaches or dizziness.  Monitor blood pressure regularly.  Follow up with your PCP for further management for high blood pressure, please call to make appointment within 1 week of discharge    Diagnosis: Gastritis  Assessment and Plan of Treatment: Follow up with GI doctor as outpatient for further management

## 2024-09-17 NOTE — DISCHARGE NOTE PROVIDER - CARE PROVIDER_API CALL
Fran Stewart.  [facility] Medicine  [address]  [place] [address]  Phone: [phone]  Fax: [phone]  Follow Up Time:

## 2024-09-17 NOTE — PROGRESS NOTE ADULT - ASSESSMENT
49M PMHx gastritis, HTN who presents with 1 day of abdominal pain found to have acute pancreatitis.       Acute Pancreatitis.   ·  Plan: Etiology unclear.  No alcohol use, no history of gallstones, no recent abdominal procedures. TGs wnl  - pain control, anti-emetics prn  MRCP: *  Acute interstitial edematous pancreatitis. No evidence of pancreatic   necrosis or drainable peripancreatic fluid collection.  *  No evidence of choledocholithiasis or biliary ductal dilatation.  Pain is improving will attempt to change to p.o. Dilaudid and if pain under reasonable control will discharge home with follow-up as an outpatient..   Tolerating p.o.  pt had " upper normal " temp   refused rectal te,p per team   monitor as op   stable for dc   fu with gi and pmd       ·  Problem: Hypertension.   ·  Plan: - c/w amlodipine.    Gastritis.   ·  Plan: - maalox prn  - outpatient GI follow up.

## 2024-09-17 NOTE — PROVIDER CONTACT NOTE (OTHER) - ACTION/TREATMENT ORDERED:
provider to RN placed ok to give dilaudid now
No additional interventions presently
provider to RN placed ok to give dilaudid now
No interventions presently

## 2024-09-17 NOTE — PROVIDER CONTACT NOTE (OTHER) - BACKGROUND
pt admitted for pancreatitis
pt admitted for pancreatitis
Pt was admitted with acute pancreatitis
Pt was admitted with acute pancraeatitis

## 2024-09-17 NOTE — PROVIDER CONTACT NOTE (OTHER) - ASSESSMENT
Pt AxOx4. pt complaining of pain, can't get pain meds until 21:55. pt states that he is uncomfortable
Pt refused senna
Pt AxOx4. pt complaining of pain, can't get pain meds until 1:10
Pt HR is 99.3F and HR is 57. Pt said no rectal temp

## 2024-09-17 NOTE — DISCHARGE NOTE NURSING/CASE MANAGEMENT/SOCIAL WORK - NSDCVIVACCINE_GEN_ALL_CORE_FT
Tdap; 07-Sep-2019 03:06; Trupti Burdick (RN); [employer]; [id number] (Exp. Date: 07-Sep-2019); IntraMuscular; Deltoid Left.; 0.5 milliLiter(s); VIS (VIS Published: 09-May-2013, VIS Presented: 07-Sep-2019);

## 2024-09-17 NOTE — PROGRESS NOTE ADULT - PROVIDER SPECIALTY LIST ADULT
Gastroenterology
Internal Medicine
Gastroenterology
Internal Medicine

## 2024-09-17 NOTE — DISCHARGE NOTE NURSING/CASE MANAGEMENT/SOCIAL WORK - PATIENT PORTAL LINK FT
You can access the FollowMyHealth Patient Portal offered by [facility] by registering at the following website: [email or web address]. By joining [employer]’s FollowMyHealth portal, you will also be able to view your health information using other applications (apps) compatible with our system.

## 2024-09-17 NOTE — DISCHARGE NOTE NURSING/CASE MANAGEMENT/SOCIAL WORK - NSDCPEFALRISK_GEN_ALL_CORE
For information on Fall & Injury Prevention, visit: [email or web address] OR  [email or web address] OR  https://www.cdc.gov/steadi/patient.html

## 2024-09-17 NOTE — PROVIDER CONTACT NOTE (OTHER) - SITUATION
pt complaining of pain, can't get pain meds until 1:10
Pt HR is 99.3F and HR is 57. Pt said no rectal temp
Pt refused senna
pt complaining of pain, can't get pain meds until 21:55

## 2024-09-17 NOTE — DISCHARGE NOTE PROVIDER - HOSPITAL COURSE
49M PMHx gastritis, HTN who presents with 1 day of abdominal pain found to have acute pancreatitis.     Pancreatitis.   - MRCP-  Acute interstitial edematous pancreatitis. No evidence of pancreatic necrosis or drainable peripancreatic fluid collection. No evidence of choledocholithiasis or biliary ductal dilatation  - hx of chronic upper gi complaints sp egd last yr reportedly unrevealing  - LR @ 150ccs/hr, pain control, anti-emetics prn  - advance diet as tolerated  - pt should f/u with his primary GI upon dc    Hypertension- c/w Amlodipine    Gastritis- Maalox prn  - outpatient GI follow up    Dispo -home with outpt f/u

## 2024-09-17 NOTE — PROGRESS NOTE ADULT - SUBJECTIVE AND OBJECTIVE BOX
Date of service: 09-17-24 @ 16:29      Patient is a 49y old  Male who presents with a chief complaint of Pancreatitis (17 Sep 2024 15:51)                                                               INTERVAL HPI/OVERNIGHT EVENTS:    REVIEW OF SYSTEMS:     CONSTITUTIONAL: No weakness, fevers or chills  RESPIRATORY: No cough, wheezing,  No shortness of breath  CARDIOVASCULAR: No chest pain or palpitations  GASTROINTESTINAL: No abdominal pain  . No nausea, vomiting, or hematemesis; No diarrhea or constipation. No melena or hematochezia.  GENITOURINARY: No dysuria, frequency or hematuria  NEUROLOGICAL: No numbness or weakness  SKIN: No itching, burning, rashes, or lesions                                                                                                                                                                                                                                                                                 Medications:  MEDICATIONS  (STANDING):  amLODIPine   Tablet 5 milliGRAM(s) Oral daily  enoxaparin Injectable 40 milliGRAM(s) SubCutaneous every 24 hours  escitalopram 20 milliGRAM(s) Oral daily  famotidine    Tablet 40 milliGRAM(s) Oral at bedtime  influenza   Vaccine 0.5 milliLiter(s) IntraMuscular once  pantoprazole    Tablet 40 milliGRAM(s) Oral before breakfast  senna 2 Tablet(s) Oral at bedtime    MEDICATIONS  (PRN):  acetaminophen     Tablet .. 650 milliGRAM(s) Oral every 6 hours PRN Mild Pain (1 - 3)  aluminum hydroxide/magnesium hydroxide/simethicone Suspension 30 milliLiter(s) Oral every 4 hours PRN Dyspepsia  HYDROmorphone   Tablet 2 milliGRAM(s) Oral every 4 hours PRN Moderate Pain (4 - 6)  HYDROmorphone   Tablet 4 milliGRAM(s) Oral every 8 hours PRN Severe Pain (7 - 10)  ondansetron Injectable 4 milliGRAM(s) IV Push every 8 hours PRN Nausea and/or Vomiting  polyethylene glycol 3350 17 Gram(s) Oral daily PRN Constipation       Allergies    No Known Allergies    Intolerances      Vital Signs Last 24 Hrs  T(C): 37.3 (17 Sep 2024 10:45), Max: 37.4 (16 Sep 2024 21:03)  T(F): 99.1 (17 Sep 2024 10:45), Max: 99.4 (16 Sep 2024 21:03)  HR: 58 (17 Sep 2024 10:45) (57 - 60)  BP: 124/75 (17 Sep 2024 10:45) (124/75 - 136/74)  BP(mean): --  RR: 18 (17 Sep 2024 10:45) (16 - 18)  SpO2: 97% (17 Sep 2024 10:45) (97% - 100%)    Parameters below as of 17 Sep 2024 10:45  Patient On (Oxygen Delivery Method): room air      CAPILLARY BLOOD GLUCOSE          Physical Exam:    Daily     Daily   General:  Well appearing, NAD, not cachetic  HEENT:  Nonicteric, PERRLA  CV:  RRR, S1S2   Lungs:  CTA B/L, no wheezes, rales, rhonchi  Abdomen:  Soft, non-tender, no distended, positive BS  Extremities:  2+ pulses, no c/c, no edema  Skin:  Warm and dry, no rashes  :  No temple  Neuro:  AAOx3, non-focal, grossly intact                                                                                                                                                                                                                                                                                                LABS:                               12.5   10.02 )-----------( 180      ( 17 Sep 2024 06:07 )             35.8                      09-17    140  |  104  |  7   ----------------------------<  80  3.5   |  22  |  0.65    Ca    7.8[L]      17 Sep 2024 06:07  Phos  2.3     09-17  Mg     1.90     09-17                         RADIOLOGY & ADDITIONAL TESTS         I personally reviewed: [  ]EKG   [  ]CXR    [  ] CT      A/P:         Discussed with :     Hayde consultants' Notes   Time spent :

## 2024-09-26 ENCOUNTER — INPATIENT (INPATIENT)
Facility: HOSPITAL | Age: 49
LOS: 5 days | Discharge: HOME | End: 2024-10-02
Attending: SURGERY | Admitting: SURGERY
Payer: COMMERCIAL

## 2024-09-26 VITALS
DIASTOLIC BLOOD PRESSURE: 80 MMHG | HEART RATE: 71 BPM | WEIGHT: 220.02 LBS | RESPIRATION RATE: 15 BRPM | TEMPERATURE: 98 F | OXYGEN SATURATION: 100 % | SYSTOLIC BLOOD PRESSURE: 134 MMHG | HEIGHT: 72 IN

## 2024-09-26 PROBLEM — I10 ESSENTIAL (PRIMARY) HYPERTENSION: Chronic | Status: ACTIVE | Noted: 2024-09-12

## 2024-09-26 PROBLEM — K29.70 GASTRITIS, UNSPECIFIED, WITHOUT BLEEDING: Chronic | Status: ACTIVE | Noted: 2024-09-12

## 2024-09-26 LAB
ADD ON TEST-SPECIMEN IN LAB: SIGNIFICANT CHANGE UP
ALBUMIN SERPL ELPH-MCNC: 3.9 G/DL — SIGNIFICANT CHANGE UP (ref 3.3–5)
ALP SERPL-CCNC: 77 U/L — SIGNIFICANT CHANGE UP (ref 40–120)
ALT FLD-CCNC: 61 U/L — HIGH (ref 4–41)
ANION GAP SERPL CALC-SCNC: 15 MMOL/L — HIGH (ref 7–14)
AST SERPL-CCNC: 29 U/L — SIGNIFICANT CHANGE UP (ref 4–40)
BASOPHILS # BLD AUTO: 0.07 K/UL — SIGNIFICANT CHANGE UP (ref 0–0.2)
BASOPHILS NFR BLD AUTO: 0.4 % — SIGNIFICANT CHANGE UP (ref 0–2)
BILIRUB SERPL-MCNC: 0.4 MG/DL — SIGNIFICANT CHANGE UP (ref 0.2–1.2)
BUN SERPL-MCNC: 10 MG/DL — SIGNIFICANT CHANGE UP (ref 7–23)
CALCIUM SERPL-MCNC: 9.1 MG/DL — SIGNIFICANT CHANGE UP (ref 8.4–10.5)
CHLORIDE SERPL-SCNC: 103 MMOL/L — SIGNIFICANT CHANGE UP (ref 98–107)
CO2 SERPL-SCNC: 22 MMOL/L — SIGNIFICANT CHANGE UP (ref 22–31)
CREAT SERPL-MCNC: 0.77 MG/DL — SIGNIFICANT CHANGE UP (ref 0.5–1.3)
EGFR: 110 ML/MIN/1.73M2 — SIGNIFICANT CHANGE UP
EOSINOPHIL # BLD AUTO: 0.22 K/UL — SIGNIFICANT CHANGE UP (ref 0–0.5)
EOSINOPHIL NFR BLD AUTO: 1.3 % — SIGNIFICANT CHANGE UP (ref 0–6)
GLUCOSE SERPL-MCNC: 105 MG/DL — HIGH (ref 70–99)
HCT VFR BLD CALC: 41.7 % — SIGNIFICANT CHANGE UP (ref 39–50)
HGB BLD-MCNC: 14.3 G/DL — SIGNIFICANT CHANGE UP (ref 13–17)
IANC: 13.25 K/UL — HIGH (ref 1.8–7.4)
IMM GRANULOCYTES NFR BLD AUTO: 0.5 % — SIGNIFICANT CHANGE UP (ref 0–0.9)
LIDOCAIN IGE QN: >3000 U/L — HIGH (ref 7–60)
LYMPHOCYTES # BLD AUTO: 1.92 K/UL — SIGNIFICANT CHANGE UP (ref 1–3.3)
LYMPHOCYTES # BLD AUTO: 11.7 % — LOW (ref 13–44)
MCHC RBC-ENTMCNC: 30.8 PG — SIGNIFICANT CHANGE UP (ref 27–34)
MCHC RBC-ENTMCNC: 34.3 GM/DL — SIGNIFICANT CHANGE UP (ref 32–36)
MCV RBC AUTO: 89.7 FL — SIGNIFICANT CHANGE UP (ref 80–100)
MONOCYTES # BLD AUTO: 0.81 K/UL — SIGNIFICANT CHANGE UP (ref 0–0.9)
MONOCYTES NFR BLD AUTO: 5 % — SIGNIFICANT CHANGE UP (ref 2–14)
NEUTROPHILS # BLD AUTO: 13.25 K/UL — HIGH (ref 1.8–7.4)
NEUTROPHILS NFR BLD AUTO: 81.1 % — HIGH (ref 43–77)
NRBC # BLD: 0 /100 WBCS — SIGNIFICANT CHANGE UP (ref 0–0)
NRBC # FLD: 0 K/UL — SIGNIFICANT CHANGE UP (ref 0–0)
PLATELET # BLD AUTO: 393 K/UL — SIGNIFICANT CHANGE UP (ref 150–400)
POTASSIUM SERPL-MCNC: 3.5 MMOL/L — SIGNIFICANT CHANGE UP (ref 3.5–5.3)
POTASSIUM SERPL-SCNC: 3.5 MMOL/L — SIGNIFICANT CHANGE UP (ref 3.5–5.3)
PROT SERPL-MCNC: 7.1 G/DL — SIGNIFICANT CHANGE UP (ref 6–8.3)
RBC # BLD: 4.65 M/UL — SIGNIFICANT CHANGE UP (ref 4.2–5.8)
RBC # FLD: 13.1 % — SIGNIFICANT CHANGE UP (ref 10.3–14.5)
SODIUM SERPL-SCNC: 140 MMOL/L — SIGNIFICANT CHANGE UP (ref 135–145)
WBC # BLD: 16.35 K/UL — HIGH (ref 3.8–10.5)
WBC # FLD AUTO: 16.35 K/UL — HIGH (ref 3.8–10.5)

## 2024-09-26 PROCEDURE — 99285 EMERGENCY DEPT VISIT HI MDM: CPT

## 2024-09-26 PROCEDURE — 99222 1ST HOSP IP/OBS MODERATE 55: CPT

## 2024-09-26 PROCEDURE — 74177 CT ABD & PELVIS W/CONTRAST: CPT | Mod: 26,MC

## 2024-09-26 RX ORDER — ENOXAPARIN SODIUM 150 MG/ML
40 INJECTION SUBCUTANEOUS EVERY 24 HOURS
Refills: 0 | Status: DISCONTINUED | OUTPATIENT
Start: 2024-09-26 | End: 2024-10-02

## 2024-09-26 RX ORDER — HYDROMORPHONE HYDROCHLORIDE 1 MG/ML
1 INJECTION, SOLUTION INTRAMUSCULAR; INTRAVENOUS; SUBCUTANEOUS ONCE
Refills: 0 | Status: DISCONTINUED | OUTPATIENT
Start: 2024-09-26 | End: 2024-09-26

## 2024-09-26 RX ORDER — FAMOTIDINE 40 MG
40 TABLET ORAL DAILY
Refills: 0 | Status: DISCONTINUED | OUTPATIENT
Start: 2024-09-26 | End: 2024-10-02

## 2024-09-26 RX ORDER — ESCITALOPRAM OXALATE 10 MG
20 TABLET ORAL DAILY
Refills: 0 | Status: DISCONTINUED | OUTPATIENT
Start: 2024-09-26 | End: 2024-10-02

## 2024-09-26 RX ORDER — OXYCODONE HYDROCHLORIDE 30 MG/1
5 TABLET, FILM COATED, EXTENDED RELEASE ORAL EVERY 4 HOURS
Refills: 0 | Status: DISCONTINUED | OUTPATIENT
Start: 2024-09-26 | End: 2024-09-28

## 2024-09-26 RX ORDER — SODIUM CHLORIDE IRRIG SOLUTION 0.9 %
1000 SOLUTION, IRRIGATION IRRIGATION
Refills: 0 | Status: DISCONTINUED | OUTPATIENT
Start: 2024-09-26 | End: 2024-09-29

## 2024-09-26 RX ORDER — OXYCODONE HYDROCHLORIDE 30 MG/1
2.5 TABLET, FILM COATED, EXTENDED RELEASE ORAL EVERY 4 HOURS
Refills: 0 | Status: DISCONTINUED | OUTPATIENT
Start: 2024-09-26 | End: 2024-09-28

## 2024-09-26 RX ORDER — MORPHINE SULFATE 30 MG/1
4 TABLET, FILM COATED, EXTENDED RELEASE ORAL ONCE
Refills: 0 | Status: DISCONTINUED | OUTPATIENT
Start: 2024-09-26 | End: 2024-09-26

## 2024-09-26 RX ORDER — INFLUENZA VIRUS VACCINE 15; 15; 15; 15 UG/.5ML; UG/.5ML; UG/.5ML; UG/.5ML
0.5 SUSPENSION INTRAMUSCULAR ONCE
Refills: 0 | Status: DISCONTINUED | OUTPATIENT
Start: 2024-09-26 | End: 2024-10-02

## 2024-09-26 RX ORDER — AMLODIPINE BESYLATE 5 MG
5 TABLET ORAL DAILY
Refills: 0 | Status: DISCONTINUED | OUTPATIENT
Start: 2024-09-26 | End: 2024-10-02

## 2024-09-26 RX ORDER — SODIUM CHLORIDE 0.9 % (FLUSH) 0.9 %
2000 SYRINGE (ML) INJECTION ONCE
Refills: 0 | Status: COMPLETED | OUTPATIENT
Start: 2024-09-26 | End: 2024-09-26

## 2024-09-26 RX ORDER — PANTOPRAZOLE SODIUM 40 MG/1
40 TABLET, DELAYED RELEASE ORAL ONCE
Refills: 0 | Status: COMPLETED | OUTPATIENT
Start: 2024-09-26 | End: 2024-09-26

## 2024-09-26 RX ORDER — ACETAMINOPHEN 325 MG
975 TABLET ORAL EVERY 6 HOURS
Refills: 0 | Status: DISCONTINUED | OUTPATIENT
Start: 2024-09-26 | End: 2024-10-02

## 2024-09-26 RX ORDER — ONDANSETRON HCL/PF 4 MG/2 ML
4 VIAL (ML) INJECTION ONCE
Refills: 0 | Status: COMPLETED | OUTPATIENT
Start: 2024-09-26 | End: 2024-09-26

## 2024-09-26 RX ADMIN — Medication 125 MILLILITER(S): at 14:49

## 2024-09-26 RX ADMIN — OXYCODONE HYDROCHLORIDE 5 MILLIGRAM(S): 30 TABLET, FILM COATED, EXTENDED RELEASE ORAL at 22:04

## 2024-09-26 RX ADMIN — HYDROMORPHONE HYDROCHLORIDE 1 MILLIGRAM(S): 1 INJECTION, SOLUTION INTRAMUSCULAR; INTRAVENOUS; SUBCUTANEOUS at 13:13

## 2024-09-26 RX ADMIN — MORPHINE SULFATE 4 MILLIGRAM(S): 30 TABLET, FILM COATED, EXTENDED RELEASE ORAL at 05:39

## 2024-09-26 RX ADMIN — Medication 975 MILLIGRAM(S): at 23:00

## 2024-09-26 RX ADMIN — Medication 975 MILLIGRAM(S): at 23:35

## 2024-09-26 RX ADMIN — HYDROMORPHONE HYDROCHLORIDE 1 MILLIGRAM(S): 1 INJECTION, SOLUTION INTRAMUSCULAR; INTRAVENOUS; SUBCUTANEOUS at 04:24

## 2024-09-26 RX ADMIN — OXYCODONE HYDROCHLORIDE 5 MILLIGRAM(S): 30 TABLET, FILM COATED, EXTENDED RELEASE ORAL at 15:28

## 2024-09-26 RX ADMIN — Medication 125 MILLILITER(S): at 18:01

## 2024-09-26 RX ADMIN — Medication 20 MILLIGRAM(S): at 21:56

## 2024-09-26 RX ADMIN — HYDROMORPHONE HYDROCHLORIDE 1 MILLIGRAM(S): 1 INJECTION, SOLUTION INTRAMUSCULAR; INTRAVENOUS; SUBCUTANEOUS at 08:26

## 2024-09-26 RX ADMIN — Medication 125 MILLILITER(S): at 16:12

## 2024-09-26 RX ADMIN — OXYCODONE HYDROCHLORIDE 5 MILLIGRAM(S): 30 TABLET, FILM COATED, EXTENDED RELEASE ORAL at 21:34

## 2024-09-26 RX ADMIN — PANTOPRAZOLE SODIUM 40 MILLIGRAM(S): 40 TABLET, DELAYED RELEASE ORAL at 04:24

## 2024-09-26 RX ADMIN — Medication 4 MILLIGRAM(S): at 04:04

## 2024-09-26 RX ADMIN — MORPHINE SULFATE 4 MILLIGRAM(S): 30 TABLET, FILM COATED, EXTENDED RELEASE ORAL at 04:04

## 2024-09-26 RX ADMIN — Medication 2000 MILLILITER(S): at 04:07

## 2024-09-26 RX ADMIN — Medication 975 MILLIGRAM(S): at 17:57

## 2024-09-26 RX ADMIN — Medication 975 MILLIGRAM(S): at 18:45

## 2024-09-26 RX ADMIN — HYDROMORPHONE HYDROCHLORIDE 1 MILLIGRAM(S): 1 INJECTION, SOLUTION INTRAMUSCULAR; INTRAVENOUS; SUBCUTANEOUS at 08:44

## 2024-09-26 RX ADMIN — HYDROMORPHONE HYDROCHLORIDE 1 MILLIGRAM(S): 1 INJECTION, SOLUTION INTRAMUSCULAR; INTRAVENOUS; SUBCUTANEOUS at 05:39

## 2024-09-26 RX ADMIN — HYDROMORPHONE HYDROCHLORIDE 1 MILLIGRAM(S): 1 INJECTION, SOLUTION INTRAMUSCULAR; INTRAVENOUS; SUBCUTANEOUS at 14:00

## 2024-09-26 NOTE — PATIENT PROFILE ADULT - FALL HARM RISK - HARM RISK INTERVENTIONS

## 2024-09-26 NOTE — ED PROVIDER NOTE - PROGRESS NOTE DETAILS
Attending MD Hernandez.  Pt signed out to me in stable condition pending CT// TBA Garrick, 48 yo male with acute panc, admitted ~1.5 wks ago for panc, idiopathic, dc'd now same pain last night. Marta Johnson M.D. (Resident Physician): surgery consulted due to concern for necrotizing pancreatitis.

## 2024-09-26 NOTE — ED PROVIDER NOTE - OBJECTIVE STATEMENT
50 y/o M with PMHx of ideopathic pancreatitis presents to the ED abdominal pain for the last few days. Pain is epigastric region and sharp in nature. Pain is similar to previous episode of pancreatitis. Pt had recent admission for a first time episode of acute uncomplicated idiopathic pancreatitis. Pt endorses nausea and vomiting today as well. Pt took PO dilaudid at home without relief of symptoms. Denies fevers, chills, HA, vision changes, CP, SOB, diarrhea/constipation, dysuria, hematuria.

## 2024-09-26 NOTE — ED ADULT NURSE NOTE - OBJECTIVE STATEMENT
Pt received to 26A, A&Ox4. Pt hx pancreatitis. Pt endorsing severe epigastric pain radiating to b/l lower quadrants with associated nausea. Pt states he was seen in the hospital and recently admitted for pancreatitis, was taking at home PO Dilaudid with minimal relief. Pt denies urinary symptoms, headache, dizziness, fevers, chills, constipation, diarrhea verbalized. Pt appears uncomfortable. 20G IV established to left ac, labs collected. Medicated per MAR. respirations even and unlabored. safety maintained throughout. comfort measures provided

## 2024-09-26 NOTE — ED PROVIDER NOTE - CLINICAL SUMMARY MEDICAL DECISION MAKING FREE TEXT BOX
48 y/o M with PMHx of ideopathic pancreatitis presents to the ED abdominal pain for the last few days. Recent admission for first time dx of pancreatitis. On last admission, no gallstones or elevated triglycerides noted. Denies EtOH use. Pt uncomfortable appearing. + epigastric TTP w/ voluntary guarding. + n/v. Likely reoccurrence of pancreatitis. Plan for labs, IVF and analgesia. Pt had recent CT and MRI that did not show drainable collection or necrosis so repeat imaging not needed. Dispo pending pain control.

## 2024-09-26 NOTE — ED PROVIDER NOTE - PHYSICAL EXAMINATION
Constitutional: VS reviewed. Alert and orientedx3, uncomfortable appearing   HEENT: Atraumatic, EOMI, PERRL   CV: RRR  Lungs: Clear and equal bilaterally, no wheezes, rales or crackles  Abdomen: Soft, nondistended, + TTP of epigastric region w/ voluntary guarding   MSK: No deformities  Skin: Warm and dry. As visualized no rashes, lesions, bruising or erythema  Neuro: Strength and sensation intact.   Lymph: No pitting edema in extremities.

## 2024-09-26 NOTE — ED ADULT NURSE NOTE - ALCOHOL PRE SCREEN (AUDIT - C)
----- Message from Skylar Puckett sent at 3/10/2020  2:29 PM EDT -----  Regarding: Dr. Alberto Soler: [phone]  5 [facility] (if not patient): Jason Thompson N/A  Reason for call: She is requesting  a call back, reason not disclosed. Callback required yes/no and why: Yes.   Best contact number(s): [phone]  Details to clarify N/A Statement Selected

## 2024-09-26 NOTE — H&P ADULT - ASSESSMENT
49M with recent history of acute appendicitis, now returning with worsening pain and found to have necrotizing pancreatitis.     Plan:  - Admit to [facility]  - CLD  - MRCP  - IVF resuscitation     B Team Surgry [id number] 49M with recent history of acute pancreatitis, now returning with worsening pain and found to have necrotizing pancreatitis.     Plan:  - Admit to [facility]  - CLD  - MRCP  - IVF resuscitation     B Team Surgry [id number]

## 2024-09-26 NOTE — H&P ADULT - MLM HIDDEN
Marya Darnell RN        
yes

## 2024-09-26 NOTE — H&P ADULT - HISTORY OF PRESENT ILLNESS
49M with recent admission for pancreatitis (presumed gallstone) presenting with increasing abdominal pain for last 4 days. He reports the pain was controlled when discharged last week but progressively worsened over last few days. Reports associated nausea, vomiting and bloating. He denies fevers or chills. On last admission, patient began feeling better and MRCP negative for stones.

## 2024-09-26 NOTE — ED PROVIDER NOTE - ATTENDING CONTRIBUTION TO CARE
48 y/o M with recent hospitalization for idiopathic pancreatitis p/w recurrence of epigastric pain radiating to back since 12am.  Pt reports similar sxs to his recent bout of pancreatitis, no new sxs.  No fever, cp, constipation or diarrhea.  No etoh use.  Pt has been slowly advancing diet at home and taking PO dilaudid since dc with relief until tonight.  He is due for GI follow-up next week - last EGD/colonoscopy within the past couple years and reported to be normal.    Pt lying in stretcher, awake and alert, nontoxic.  AF/VSS.  Lungs cta bl.  Cards nl S1/S2, RRR, no MRG.  Abd soft obese (+)epigastric tenderness, neg murphys sign, no rebound or guarding.  No pedal edema or calf tenderness.  No focal neuro deficits.    Concern for recurrent pancreatitis - pt with recent hospitalization including imaging and MRI within the past 1-2 weeks, no indication to repeat at this time.  Will check labs, iv hydration and pain control.

## 2024-09-26 NOTE — ED PROVIDER NOTE - IV ALTEPLASE EXCL ABS HIDDEN
[facility]  Pre-procedure History and Physical      Nicolás Greene Patient Status:  Hospital Outpatient Surgery    1956 MRN [id number]   [facility] ENDOSCOPY Attending Helene Almeida MD   Hosp Day # 0 PCP Yolis Wan DO show

## 2024-09-26 NOTE — ED ADULT NURSE NOTE - NS ED NURSE REPORT GIVEN TO FT
Post Transplant Nutrition Plan of Care    Nutrition Diagnosis:  Increased nutrient needs related to healing s/p kidney transplant 11/30 as evidenced by estimated nutrient needs.    Food-and-nutrition-related knowledge deficit related to diet s/p organ transplant as evidenced by need for diet education, patient s/p kidney transplant 11/30.    Intervention/Plan for discharge:  Nutrition relationship to health and disease for post transplant was discussed and materials provided:  • Provided copy of Organ Transplant Nutrition Therapy Diet.  • Reviewed nutrition side effects of post transplant medications and how it may impact their diet.  • Reviewed handout on Food Safety and Sanitation Guidelines.  • RD name and contact information provided.    Recommended modifications: Reviewed food safety guidelines and organ transplant nutrition therapy. OK to incorporate previously limited foods (previously followed renal diet). However, emphasized importance of focusing more on the healthier foods (low fat dairy, fruits, vegetables, whole grains) rather than the high fat, high sodium foods (pizza, cheeseburgers, etc).    Discharge and transfer to other providers - Plan for discharge: Education for home was completed on 12/4/18. Will continue to reinforce diet as needed.    Referral to RD with different expertise: Recommend referral to outpatient RD for comprehensive pre/post transplant diet education, including    [x] Diet reinforcement   [x] Reassessment of nutrition status    General/healthful diet:  Continue Regular diet.     Commercial beverage:  Will continue high protein low calorie supplement 1x daily to improve protein intake.  --Encouraged  to consider consuming 1x daily at home for the next ~1 month to promote healing.    Monitoring and Evaluation:  Knowledge/Beliefs/Attitudes: Area(s) and level of knowledge: Patient verbalized a basic level of understanding of post transplant diet guidelines and willingness to make  diet modifications.    Amount of food:  Goal: patient to consume and tolerate >/= 75% of most meals and supplement (goal met).   Lea

## 2024-09-26 NOTE — H&P ADULT - NSICDXPASTMEDICALHX_GEN_ALL_CORE_FT
Chief Complaint   Patient presents with    Cardiac Clearance       Patient Active Problem List    Diagnosis Date Noted    Syncope 02/16/2018     Overview Note:     Neurocardiogenic - defecation syncope        Valvular heart disease 02/16/2018     Overview Note:     A. Echo 1/30/2018 :  Mild MR with posteriorly directed jet. EF 55%        Mixed hyperlipidemia 02/16/2018    Type 2 diabetes mellitus with other specified complication (HCC) 16/17/9044       Current Outpatient Medications   Medication Sig Dispense Refill    JARDIANCE 25 MG tablet       irbesartan (AVAPRO) 75 MG tablet Take 1 tablet by mouth daily      albuterol sulfate HFA (PROVENTIL;VENTOLIN;PROAIR) 108 (90 Base) MCG/ACT inhaler Inhale 2 puffs into the lungs every 6 hours as needed for Wheezing      metFORMIN (GLUCOPHAGE) 1000 MG tablet Take 1 tablet by mouth 2 times daily (with meals)      atorvastatin (LIPITOR) 20 MG tablet Take 1 tablet by mouth daily      multivitamin (THERAGRAN) per tablet Take 1 tablet by mouth daily LD 7-30-17      calcium carbonate-vitamin D 600-200 MG-UNIT TABS Take by mouth Ld 7-30-17       No current facility-administered medications for this visit. Allergies   Allergen Reactions    Adhesive Tape Rash    Sulfa Antibiotics        Vitals:    08/17/23 0833   BP: (!) 140/71   Pulse: 88   Resp: 16   Weight: 110 lb (49.9 kg)   Height: 5' 3\" (1.6 m)               SUBJECTIVE: Nichole Butler presents to the office today for pre-op assessment prior to shoulder surgery by dr Tejal Luis at [facility]. She complains of NO cardiac symptoms, nor decline in her functional capacity, does all heavy household chores and no syncope and denies   chest pain, chest pressure/discomfort, claudication, dyspnea, exertional chest pressure/discomfort, fatigue, irregular heart beat, lower extremity edema, orthopnea, palpitations, syncope and tachypnea. Known neurocardiogenic syncope, with defecation syncope variant.   Quiescent for PAST MEDICAL HISTORY:  Gastritis     Hypertension

## 2024-09-26 NOTE — ED ADULT NURSE REASSESSMENT NOTE - NS ED NURSE REASSESS COMMENT FT1
PT is resting in stretcher, easily arousable to verbal stimuli. no apparent distress noted. pt plan of care on going. pt fluids infusing per MD orders.
Pt resting in stretcher, no acute distress noted. Pt offers no complaints at this time, states relief of pain from previous interventions. respirations even and unlabored. safety maintained throughout

## 2024-09-26 NOTE — ED ADULT TRIAGE NOTE - CHIEF COMPLAINT QUOTE
presents C/O abdominal pain. seen in ED discharged with pancreatitis. +nausea. No complaints of chest pain, headache,  dizziness, vomiting  SOB, fever, chills verbalized. Phx Pancreatitis

## 2024-09-26 NOTE — H&P ADULT - ATTENDING COMMENTS
The patient was seen and examined, chart and notes reviewed.  The current diagnosis, plan of care and alternatives have been discussed with the patient.  All questions have been answered and updates have been discussed.  The case was discussed with B team residents/PA's and medical students at morning B surgical rounds and throughout the course of the day.    Acute pancreatic necrosis  a.  Admit  b.  NPO  c.  Continue IVF resuscitation  d.  CT reviewed  e.  Discuss possible in-pt cholecystectomy   f.  Start chemical DVT prophylaxis  g.  No role for abx at this time  h.  Trend LFTs

## 2024-09-26 NOTE — H&P ADULT - NSHPPHYSICALEXAM_GEN_ALL_CORE
General: alert and oriented, NAD  Resp: airway patent, respirations unlabored  CVS: regular rate and rhythm  Abdomen: soft, distended, tender to palpation in epigastric area  Extremities: no edema  Skin: warm, dry, appropriate color

## 2024-09-27 LAB
ALBUMIN SERPL ELPH-MCNC: 3.5 G/DL — SIGNIFICANT CHANGE UP (ref 3.3–5)
ALP SERPL-CCNC: 72 U/L — SIGNIFICANT CHANGE UP (ref 40–120)
ALT FLD-CCNC: 37 U/L — SIGNIFICANT CHANGE UP (ref 4–41)
ANION GAP SERPL CALC-SCNC: 12 MMOL/L — SIGNIFICANT CHANGE UP (ref 7–14)
AST SERPL-CCNC: 16 U/L — SIGNIFICANT CHANGE UP (ref 4–40)
BILIRUB SERPL-MCNC: 0.8 MG/DL — SIGNIFICANT CHANGE UP (ref 0.2–1.2)
BUN SERPL-MCNC: 6 MG/DL — LOW (ref 7–23)
CALCIUM SERPL-MCNC: 8.5 MG/DL — SIGNIFICANT CHANGE UP (ref 8.4–10.5)
CHLORIDE SERPL-SCNC: 103 MMOL/L — SIGNIFICANT CHANGE UP (ref 98–107)
CO2 SERPL-SCNC: 22 MMOL/L — SIGNIFICANT CHANGE UP (ref 22–31)
CREAT SERPL-MCNC: 0.65 MG/DL — SIGNIFICANT CHANGE UP (ref 0.5–1.3)
EGFR: 116 ML/MIN/1.73M2 — SIGNIFICANT CHANGE UP
GLUCOSE SERPL-MCNC: 80 MG/DL — SIGNIFICANT CHANGE UP (ref 70–99)
HCT VFR BLD CALC: 37.9 % — LOW (ref 39–50)
HGB BLD-MCNC: 13.2 G/DL — SIGNIFICANT CHANGE UP (ref 13–17)
MAGNESIUM SERPL-MCNC: 2.1 MG/DL — SIGNIFICANT CHANGE UP (ref 1.6–2.6)
MCHC RBC-ENTMCNC: 31.2 PG — SIGNIFICANT CHANGE UP (ref 27–34)
MCHC RBC-ENTMCNC: 34.8 GM/DL — SIGNIFICANT CHANGE UP (ref 32–36)
MCV RBC AUTO: 89.6 FL — SIGNIFICANT CHANGE UP (ref 80–100)
NRBC # BLD: 0 /100 WBCS — SIGNIFICANT CHANGE UP (ref 0–0)
NRBC # FLD: 0 K/UL — SIGNIFICANT CHANGE UP (ref 0–0)
PHOSPHATE SERPL-MCNC: 2.4 MG/DL — LOW (ref 2.5–4.5)
PLATELET # BLD AUTO: 342 K/UL — SIGNIFICANT CHANGE UP (ref 150–400)
POTASSIUM SERPL-MCNC: 3.2 MMOL/L — LOW (ref 3.5–5.3)
POTASSIUM SERPL-SCNC: 3.2 MMOL/L — LOW (ref 3.5–5.3)
PROCALCITONIN SERPL-MCNC: 0.09 NG/ML — SIGNIFICANT CHANGE UP (ref 0.02–0.1)
PROT SERPL-MCNC: 6.6 G/DL — SIGNIFICANT CHANGE UP (ref 6–8.3)
RBC # BLD: 4.23 M/UL — SIGNIFICANT CHANGE UP (ref 4.2–5.8)
RBC # FLD: 13.1 % — SIGNIFICANT CHANGE UP (ref 10.3–14.5)
SODIUM SERPL-SCNC: 137 MMOL/L — SIGNIFICANT CHANGE UP (ref 135–145)
WBC # BLD: 12.97 K/UL — HIGH (ref 3.8–10.5)
WBC # FLD AUTO: 12.97 K/UL — HIGH (ref 3.8–10.5)

## 2024-09-27 PROCEDURE — 74183 MRI ABD W/O CNTR FLWD CNTR: CPT | Mod: 26

## 2024-09-27 PROCEDURE — 99231 SBSQ HOSP IP/OBS SF/LOW 25: CPT

## 2024-09-27 RX ORDER — SOD PHOS DI, MONO/K PHOS MONO 250 MG
1 TABLET ORAL ONCE
Refills: 0 | Status: COMPLETED | OUTPATIENT
Start: 2024-09-27 | End: 2024-09-27

## 2024-09-27 RX ORDER — HYDROMORPHONE HYDROCHLORIDE 1 MG/ML
0.2 INJECTION, SOLUTION INTRAMUSCULAR; INTRAVENOUS; SUBCUTANEOUS EVERY 4 HOURS
Refills: 0 | Status: DISCONTINUED | OUTPATIENT
Start: 2024-09-27 | End: 2024-10-02

## 2024-09-27 RX ADMIN — OXYCODONE HYDROCHLORIDE 5 MILLIGRAM(S): 30 TABLET, FILM COATED, EXTENDED RELEASE ORAL at 13:40

## 2024-09-27 RX ADMIN — Medication 40 MILLIGRAM(S): at 11:52

## 2024-09-27 RX ADMIN — HYDROMORPHONE HYDROCHLORIDE 0.2 MILLIGRAM(S): 1 INJECTION, SOLUTION INTRAMUSCULAR; INTRAVENOUS; SUBCUTANEOUS at 14:12

## 2024-09-27 RX ADMIN — Medication 975 MILLIGRAM(S): at 05:05

## 2024-09-27 RX ADMIN — Medication 1 TABLET(S): at 09:59

## 2024-09-27 RX ADMIN — Medication 40 MILLIEQUIVALENT(S): at 14:23

## 2024-09-27 RX ADMIN — OXYCODONE HYDROCHLORIDE 5 MILLIGRAM(S): 30 TABLET, FILM COATED, EXTENDED RELEASE ORAL at 08:55

## 2024-09-27 RX ADMIN — Medication 20 MILLIGRAM(S): at 21:34

## 2024-09-27 RX ADMIN — Medication 975 MILLIGRAM(S): at 22:13

## 2024-09-27 RX ADMIN — Medication 975 MILLIGRAM(S): at 05:57

## 2024-09-27 RX ADMIN — HYDROMORPHONE HYDROCHLORIDE 0.2 MILLIGRAM(S): 1 INJECTION, SOLUTION INTRAMUSCULAR; INTRAVENOUS; SUBCUTANEOUS at 22:30

## 2024-09-27 RX ADMIN — OXYCODONE HYDROCHLORIDE 5 MILLIGRAM(S): 30 TABLET, FILM COATED, EXTENDED RELEASE ORAL at 22:13

## 2024-09-27 RX ADMIN — HYDROMORPHONE HYDROCHLORIDE 0.2 MILLIGRAM(S): 1 INJECTION, SOLUTION INTRAMUSCULAR; INTRAVENOUS; SUBCUTANEOUS at 18:20

## 2024-09-27 RX ADMIN — Medication 975 MILLIGRAM(S): at 12:30

## 2024-09-27 RX ADMIN — Medication 975 MILLIGRAM(S): at 11:51

## 2024-09-27 RX ADMIN — Medication 40 MILLIEQUIVALENT(S): at 09:58

## 2024-09-27 RX ADMIN — OXYCODONE HYDROCHLORIDE 5 MILLIGRAM(S): 30 TABLET, FILM COATED, EXTENDED RELEASE ORAL at 04:26

## 2024-09-27 RX ADMIN — Medication 125 MILLILITER(S): at 18:22

## 2024-09-27 RX ADMIN — ENOXAPARIN SODIUM 40 MILLIGRAM(S): 150 INJECTION SUBCUTANEOUS at 05:05

## 2024-09-27 RX ADMIN — OXYCODONE HYDROCHLORIDE 5 MILLIGRAM(S): 30 TABLET, FILM COATED, EXTENDED RELEASE ORAL at 04:56

## 2024-09-27 RX ADMIN — OXYCODONE HYDROCHLORIDE 5 MILLIGRAM(S): 30 TABLET, FILM COATED, EXTENDED RELEASE ORAL at 12:59

## 2024-09-27 RX ADMIN — Medication 975 MILLIGRAM(S): at 17:07

## 2024-09-27 RX ADMIN — Medication 975 MILLIGRAM(S): at 21:34

## 2024-09-27 RX ADMIN — HYDROMORPHONE HYDROCHLORIDE 0.2 MILLIGRAM(S): 1 INJECTION, SOLUTION INTRAMUSCULAR; INTRAVENOUS; SUBCUTANEOUS at 23:08

## 2024-09-27 RX ADMIN — OXYCODONE HYDROCHLORIDE 5 MILLIGRAM(S): 30 TABLET, FILM COATED, EXTENDED RELEASE ORAL at 17:07

## 2024-09-27 RX ADMIN — HYDROMORPHONE HYDROCHLORIDE 0.2 MILLIGRAM(S): 1 INJECTION, SOLUTION INTRAMUSCULAR; INTRAVENOUS; SUBCUTANEOUS at 14:30

## 2024-09-27 RX ADMIN — Medication 5 MILLIGRAM(S): at 05:05

## 2024-09-27 RX ADMIN — OXYCODONE HYDROCHLORIDE 5 MILLIGRAM(S): 30 TABLET, FILM COATED, EXTENDED RELEASE ORAL at 21:34

## 2024-09-27 RX ADMIN — OXYCODONE HYDROCHLORIDE 5 MILLIGRAM(S): 30 TABLET, FILM COATED, EXTENDED RELEASE ORAL at 09:45

## 2024-09-27 NOTE — PROGRESS NOTE ADULT - SUBJECTIVE AND OBJECTIVE BOX
TEAM [ B ] Surgery Daily Progress Note  =====================================================    SUBJECTIVE: Patient seen and examined at bedside on AM rounds. Patient reports that they're feeling well with mild abdominal pain. Denies fever, chills, N/V, chest pain, SOB    ALLERGIES:  No Known Allergies    -------------------------------------------------------------------------------------    MEDICATIONS:  acetaminophen     Tablet .. 975 milliGRAM(s) Oral every 6 hours  amLODIPine   Tablet 5 milliGRAM(s) Oral daily  enoxaparin Injectable 40 milliGRAM(s) SubCutaneous every 24 hours  escitalopram 20 milliGRAM(s) Oral daily  famotidine    Tablet 40 milliGRAM(s) Oral daily  influenza   Vaccine 0.5 milliLiter(s) IntraMuscular once  lactated ringers. 1000 milliLiter(s) IV Continuous <Continuous>  oxyCODONE    IR 2.5 milliGRAM(s) Oral every 4 hours PRN  oxyCODONE    IR 5 milliGRAM(s) Oral every 4 hours PRN    --------------------------------------------------------------------------------------    VITAL SIGNS:  T(C): 36.9 (09-27-24 @ 05:05), Max: 37.2 (09-26-24 @ 16:00)  HR: 65 (09-27-24 @ 05:05) (65 - 75)  BP: 136/84 (09-27-24 @ 05:05) (125/87 - 145/83)  RR: 17 (09-27-24 @ 05:05) (17 - 18)  SpO2: 99% (09-27-24 @ 05:05) (98% - 100%)  --------------------------------------------------------------------------------------    INS AND OUTS:    09-26-24 @ 07:01  -  09-27-24 @ 07:00  --------------------------------------------------------  IN: 2300 mL / OUT: 350 mL / NET: 1950 mL      --------------------------------------------------------------------------------------      EXAM    General: NAD, resting in bed comfortably.  Cardiac: regular rate, warm and well perfused  Respiratory: Nonlabored respirations, normal cw expansion.  Abdomen: soft, epigastric TTP, nondistended  Extremities: normal strength, FROM, no deformities    --------------------------------------------------------------------------------------    LABS                          13.2   12.97 )-----------( 342      ( 27 Sep 2024 06:00 )             37.9   09-27    137  |  103  |  6[L]  ----------------------------<  80  3.2[L]   |  22  |  0.65    Ca    8.5      27 Sep 2024 06:00  Phos  2.4     09-27  Mg     2.10     09-27    TPro  6.6  /  Alb  3.5  /  TBili  0.8  /  DBili  x   /  AST  16  /  ALT  37  /  AlkPhos  72  09-27

## 2024-09-27 NOTE — PROGRESS NOTE ADULT - ASSESSMENT
49 year old male presents with acute interstitial edematous pancreatitis c/f nec panc    Plan  - Diet: clear liquids w LPS and juvens  -  cc/hour  - Pain control  - DVT prophylaxis  - MRCP pending    B team surgery [id number]

## 2024-09-28 LAB
ADD ON TEST-SPECIMEN IN LAB: SIGNIFICANT CHANGE UP
ALBUMIN SERPL ELPH-MCNC: 3.7 G/DL — SIGNIFICANT CHANGE UP (ref 3.3–5)
ALP SERPL-CCNC: 76 U/L — SIGNIFICANT CHANGE UP (ref 40–120)
ALT FLD-CCNC: 28 U/L — SIGNIFICANT CHANGE UP (ref 4–41)
ANION GAP SERPL CALC-SCNC: 14 MMOL/L — SIGNIFICANT CHANGE UP (ref 7–14)
AST SERPL-CCNC: 12 U/L — SIGNIFICANT CHANGE UP (ref 4–40)
BILIRUB SERPL-MCNC: 0.6 MG/DL — SIGNIFICANT CHANGE UP (ref 0.2–1.2)
BUN SERPL-MCNC: 5 MG/DL — LOW (ref 7–23)
CALCIUM SERPL-MCNC: 8.8 MG/DL — SIGNIFICANT CHANGE UP (ref 8.4–10.5)
CHLORIDE SERPL-SCNC: 102 MMOL/L — SIGNIFICANT CHANGE UP (ref 98–107)
CO2 SERPL-SCNC: 22 MMOL/L — SIGNIFICANT CHANGE UP (ref 22–31)
CREAT SERPL-MCNC: 0.58 MG/DL — SIGNIFICANT CHANGE UP (ref 0.5–1.3)
EGFR: 120 ML/MIN/1.73M2 — SIGNIFICANT CHANGE UP
GLUCOSE SERPL-MCNC: 73 MG/DL — SIGNIFICANT CHANGE UP (ref 70–99)
HCT VFR BLD CALC: 38.9 % — LOW (ref 39–50)
HGB BLD-MCNC: 13.1 G/DL — SIGNIFICANT CHANGE UP (ref 13–17)
LIDOCAIN IGE QN: 1481 U/L — HIGH (ref 7–60)
MAGNESIUM SERPL-MCNC: 2 MG/DL — SIGNIFICANT CHANGE UP (ref 1.6–2.6)
MCHC RBC-ENTMCNC: 29.9 PG — SIGNIFICANT CHANGE UP (ref 27–34)
MCHC RBC-ENTMCNC: 33.7 GM/DL — SIGNIFICANT CHANGE UP (ref 32–36)
MCV RBC AUTO: 88.8 FL — SIGNIFICANT CHANGE UP (ref 80–100)
NRBC # BLD: 0 /100 WBCS — SIGNIFICANT CHANGE UP (ref 0–0)
NRBC # FLD: 0 K/UL — SIGNIFICANT CHANGE UP (ref 0–0)
PHOSPHATE SERPL-MCNC: 2.5 MG/DL — SIGNIFICANT CHANGE UP (ref 2.5–4.5)
PLATELET # BLD AUTO: 332 K/UL — SIGNIFICANT CHANGE UP (ref 150–400)
POTASSIUM SERPL-MCNC: 3.6 MMOL/L — SIGNIFICANT CHANGE UP (ref 3.5–5.3)
POTASSIUM SERPL-SCNC: 3.6 MMOL/L — SIGNIFICANT CHANGE UP (ref 3.5–5.3)
PROT SERPL-MCNC: 6.9 G/DL — SIGNIFICANT CHANGE UP (ref 6–8.3)
RBC # BLD: 4.38 M/UL — SIGNIFICANT CHANGE UP (ref 4.2–5.8)
RBC # FLD: 13.2 % — SIGNIFICANT CHANGE UP (ref 10.3–14.5)
SODIUM SERPL-SCNC: 138 MMOL/L — SIGNIFICANT CHANGE UP (ref 135–145)
WBC # BLD: 11.46 K/UL — HIGH (ref 3.8–10.5)
WBC # FLD AUTO: 11.46 K/UL — HIGH (ref 3.8–10.5)

## 2024-09-28 RX ORDER — KETOROLAC TROMETHAMINE 10 MG/1
30 TABLET, FILM COATED ORAL EVERY 6 HOURS
Refills: 0 | Status: DISCONTINUED | OUTPATIENT
Start: 2024-09-28 | End: 2024-10-02

## 2024-09-28 RX ORDER — OXYCODONE HYDROCHLORIDE 30 MG/1
5 TABLET, FILM COATED, EXTENDED RELEASE ORAL EVERY 6 HOURS
Refills: 0 | Status: DISCONTINUED | OUTPATIENT
Start: 2024-09-28 | End: 2024-10-02

## 2024-09-28 RX ORDER — SOD PHOS DI, MONO/K PHOS MONO 250 MG
2 TABLET ORAL ONCE
Refills: 0 | Status: COMPLETED | OUTPATIENT
Start: 2024-09-28 | End: 2024-09-28

## 2024-09-28 RX ORDER — OXYCODONE HYDROCHLORIDE 30 MG/1
10 TABLET, FILM COATED, EXTENDED RELEASE ORAL EVERY 6 HOURS
Refills: 0 | Status: DISCONTINUED | OUTPATIENT
Start: 2024-09-28 | End: 2024-10-02

## 2024-09-28 RX ADMIN — ENOXAPARIN SODIUM 40 MILLIGRAM(S): 150 INJECTION SUBCUTANEOUS at 06:24

## 2024-09-28 RX ADMIN — Medication 5 MILLIGRAM(S): at 06:24

## 2024-09-28 RX ADMIN — Medication 975 MILLIGRAM(S): at 07:19

## 2024-09-28 RX ADMIN — OXYCODONE HYDROCHLORIDE 5 MILLIGRAM(S): 30 TABLET, FILM COATED, EXTENDED RELEASE ORAL at 01:48

## 2024-09-28 RX ADMIN — OXYCODONE HYDROCHLORIDE 5 MILLIGRAM(S): 30 TABLET, FILM COATED, EXTENDED RELEASE ORAL at 06:24

## 2024-09-28 RX ADMIN — Medication 40 MILLIGRAM(S): at 12:16

## 2024-09-28 RX ADMIN — OXYCODONE HYDROCHLORIDE 10 MILLIGRAM(S): 30 TABLET, FILM COATED, EXTENDED RELEASE ORAL at 21:27

## 2024-09-28 RX ADMIN — HYDROMORPHONE HYDROCHLORIDE 0.2 MILLIGRAM(S): 1 INJECTION, SOLUTION INTRAMUSCULAR; INTRAVENOUS; SUBCUTANEOUS at 06:21

## 2024-09-28 RX ADMIN — OXYCODONE HYDROCHLORIDE 5 MILLIGRAM(S): 30 TABLET, FILM COATED, EXTENDED RELEASE ORAL at 11:04

## 2024-09-28 RX ADMIN — HYDROMORPHONE HYDROCHLORIDE 0.2 MILLIGRAM(S): 1 INJECTION, SOLUTION INTRAMUSCULAR; INTRAVENOUS; SUBCUTANEOUS at 15:58

## 2024-09-28 RX ADMIN — Medication 975 MILLIGRAM(S): at 06:24

## 2024-09-28 RX ADMIN — HYDROMORPHONE HYDROCHLORIDE 0.2 MILLIGRAM(S): 1 INJECTION, SOLUTION INTRAMUSCULAR; INTRAVENOUS; SUBCUTANEOUS at 04:33

## 2024-09-28 RX ADMIN — HYDROMORPHONE HYDROCHLORIDE 0.2 MILLIGRAM(S): 1 INJECTION, SOLUTION INTRAMUSCULAR; INTRAVENOUS; SUBCUTANEOUS at 16:15

## 2024-09-28 RX ADMIN — KETOROLAC TROMETHAMINE 30 MILLIGRAM(S): 10 TABLET, FILM COATED ORAL at 12:17

## 2024-09-28 RX ADMIN — HYDROMORPHONE HYDROCHLORIDE 0.2 MILLIGRAM(S): 1 INJECTION, SOLUTION INTRAMUSCULAR; INTRAVENOUS; SUBCUTANEOUS at 22:46

## 2024-09-28 RX ADMIN — OXYCODONE HYDROCHLORIDE 10 MILLIGRAM(S): 30 TABLET, FILM COATED, EXTENDED RELEASE ORAL at 22:25

## 2024-09-28 RX ADMIN — KETOROLAC TROMETHAMINE 30 MILLIGRAM(S): 10 TABLET, FILM COATED ORAL at 17:33

## 2024-09-28 RX ADMIN — KETOROLAC TROMETHAMINE 30 MILLIGRAM(S): 10 TABLET, FILM COATED ORAL at 18:00

## 2024-09-28 RX ADMIN — HYDROMORPHONE HYDROCHLORIDE 0.2 MILLIGRAM(S): 1 INJECTION, SOLUTION INTRAMUSCULAR; INTRAVENOUS; SUBCUTANEOUS at 07:50

## 2024-09-28 RX ADMIN — Medication 125 MILLILITER(S): at 10:50

## 2024-09-28 RX ADMIN — Medication 975 MILLIGRAM(S): at 12:47

## 2024-09-28 RX ADMIN — Medication 975 MILLIGRAM(S): at 17:33

## 2024-09-28 RX ADMIN — Medication 2 PACKET(S): at 10:50

## 2024-09-28 RX ADMIN — Medication 20 MILLIEQUIVALENT(S): at 11:09

## 2024-09-28 RX ADMIN — KETOROLAC TROMETHAMINE 30 MILLIGRAM(S): 10 TABLET, FILM COATED ORAL at 12:48

## 2024-09-28 RX ADMIN — OXYCODONE HYDROCHLORIDE 5 MILLIGRAM(S): 30 TABLET, FILM COATED, EXTENDED RELEASE ORAL at 06:20

## 2024-09-28 RX ADMIN — OXYCODONE HYDROCHLORIDE 5 MILLIGRAM(S): 30 TABLET, FILM COATED, EXTENDED RELEASE ORAL at 11:45

## 2024-09-28 RX ADMIN — Medication 975 MILLIGRAM(S): at 18:12

## 2024-09-28 RX ADMIN — HYDROMORPHONE HYDROCHLORIDE 0.2 MILLIGRAM(S): 1 INJECTION, SOLUTION INTRAMUSCULAR; INTRAVENOUS; SUBCUTANEOUS at 08:00

## 2024-09-28 RX ADMIN — Medication 20 MILLIEQUIVALENT(S): at 13:27

## 2024-09-28 RX ADMIN — OXYCODONE HYDROCHLORIDE 5 MILLIGRAM(S): 30 TABLET, FILM COATED, EXTENDED RELEASE ORAL at 01:34

## 2024-09-28 RX ADMIN — Medication 975 MILLIGRAM(S): at 12:16

## 2024-09-28 RX ADMIN — Medication 20 MILLIGRAM(S): at 12:16

## 2024-09-28 NOTE — PROGRESS NOTE ADULT - ASSESSMENT
49 year old male presents with acute interstitial edematous pancreatitis c/f nec panc    Plan  - Diet: clear liquids w LPS and juvens  -  cc/hour  - Pain control  - DVT prophylaxis  - MRCP pending    B team surgery [id number] 49 year old male presents with acute interstitial edematous pancreatitis c/f nec panc    Plan  - Diet: clear liquids w LPS, ensure, joanne supplements   -  cc/hour  - Pain control PRN > increased regimen today  - DVT prophylaxis  - MRCP pending    B team surgery [id number]

## 2024-09-28 NOTE — PROGRESS NOTE ADULT - SUBJECTIVE AND OBJECTIVE BOX
SURGERY DAILY PROGRESS NOTE:     Overnight Events:  No acute events overnight.    SUBJECTIVE: Patient seen and evaluated on AM rounds. Pt is resting comfortably in bed with no complaints. Denies fever, chills, N/V, chest pain, or shortness of breath. Patient is passing gas and having bowel movements. Tolerating diet. Ambulating well. Pain is adequately controlled on current regimen.    OBJECTIVE:  Vital Signs Last 24 Hrs  T(C): 36.9 (28 Sep 2024 02:00), Max: 37.3 (27 Sep 2024 14:06)  T(F): 98.4 (28 Sep 2024 02:00), Max: 99.1 (27 Sep 2024 14:06)  HR: 69 (28 Sep 2024 02:00) (64 - 71)  BP: 130/78 (28 Sep 2024 02:00) (126/66 - 143/81)  BP(mean): --  RR: 18 (28 Sep 2024 02:00) (17 - 18)  SpO2: 98% (28 Sep 2024 02:00) (98% - 100%)    Parameters below as of 28 Sep 2024 02:00  Patient On (Oxygen Delivery Method): room air      I&O's Detail    26 Sep 2024 07:01  -  27 Sep 2024 07:00  --------------------------------------------------------  IN:    Lactated Ringers: 1500 mL    Oral Fluid: 800 mL  Total IN: 2300 mL    OUT:    Voided (mL): 350 mL  Total OUT: 350 mL    Total NET: 1950 mL      27 Sep 2024 07:01  -  28 Sep 2024 05:28  --------------------------------------------------------  IN:    Lactated Ringers: 1750 mL    Oral Fluid: 920 mL  Total IN: 2670 mL    OUT:    IV PiggyBack: 0 mL    Voided (mL): 950 mL  Total OUT: 950 mL    Total NET: 1720 mL        Daily     Daily     LABS:                        13.2   12.97 )-----------( 342      ( 27 Sep 2024 06:00 )             37.9     09-27    137  |  103  |  6[L]  ----------------------------<  80  3.2[L]   |  22  |  0.65    Ca    8.5      27 Sep 2024 06:00  Phos  2.4     09-27  Mg     2.10     09-27    TPro  6.6  /  Alb  3.5  /  TBili  0.8  /  DBili  x   /  AST  16  /  ALT  37  /  AlkPhos  72  09-27      Urinalysis Basic - ( 27 Sep 2024 06:00 )    Color: x / Appearance: x / SG: x / pH: x  Gluc: 80 mg/dL / Ketone: x  / Bili: x / Urobili: x   Blood: x / Protein: x / Nitrite: x   Leuk Esterase: x / RBC: x / WBC x   Sq Epi: x / Non Sq Epi: x / Bacteria: x                PHYSICAL EXAM:  General: NAD, resting in bed comfortably.  Cardiac: regular rate, warm and well perfused  Respiratory: Nonlabored respirations, normal cw expansion.  Abdomen: soft, epigastric TTP, nondistended  Extremities: normal strength, FROM, no deformities         SURGERY DAILY PROGRESS NOTE:     Overnight Events:  No acute events overnight.  MRCP read shown below:  IMPRESSION:  Findings consistent with pancreatitis with probable focal peripancreatic   necrosis versus peripancreatic hemorrhage anterior and lateral to the   pancreatic head. No parenchymal necrosis identified.    The previously noted stone in the region of the ampulla is not   convincingly identified and may have passed.      SUBJECTIVE: Patient seen and evaluated on AM rounds. Pt is resting comfortably in bed with no complaints. Pain adequately controlled. Denies fever, chills, N/V, chest pain, or shortness of breath. Patient is passing gas and having bowel movements. Tolerating diet.     OBJECTIVE:  Vital Signs Last 24 Hrs  T(C): 36.9 (28 Sep 2024 02:00), Max: 37.3 (27 Sep 2024 14:06)  T(F): 98.4 (28 Sep 2024 02:00), Max: 99.1 (27 Sep 2024 14:06)  HR: 69 (28 Sep 2024 02:00) (64 - 71)  BP: 130/78 (28 Sep 2024 02:00) (126/66 - 143/81)  BP(mean): --  RR: 18 (28 Sep 2024 02:00) (17 - 18)  SpO2: 98% (28 Sep 2024 02:00) (98% - 100%)    Parameters below as of 28 Sep 2024 02:00  Patient On (Oxygen Delivery Method): room air      I&O's Detail    26 Sep 2024 07:01  -  27 Sep 2024 07:00  --------------------------------------------------------  IN:    Lactated Ringers: 1500 mL    Oral Fluid: 800 mL  Total IN: 2300 mL    OUT:    Voided (mL): 350 mL  Total OUT: 350 mL    Total NET: 1950 mL      27 Sep 2024 07:01  -  28 Sep 2024 05:28  --------------------------------------------------------  IN:    Lactated Ringers: 1750 mL    Oral Fluid: 920 mL  Total IN: 2670 mL    OUT:    IV PiggyBack: 0 mL    Voided (mL): 950 mL  Total OUT: 950 mL    Total NET: 1720 mL        Daily     Daily     LABS:                        13.2   12.97 )-----------( 342      ( 27 Sep 2024 06:00 )             37.9     09-27    137  |  103  |  6[L]  ----------------------------<  80  3.2[L]   |  22  |  0.65    Ca    8.5      27 Sep 2024 06:00  Phos  2.4     09-27  Mg     2.10     09-27    TPro  6.6  /  Alb  3.5  /  TBili  0.8  /  DBili  x   /  AST  16  /  ALT  37  /  AlkPhos  72  09-27      Urinalysis Basic - ( 27 Sep 2024 06:00 )    Color: x / Appearance: x / SG: x / pH: x  Gluc: 80 mg/dL / Ketone: x  / Bili: x / Urobili: x   Blood: x / Protein: x / Nitrite: x   Leuk Esterase: x / RBC: x / WBC x   Sq Epi: x / Non Sq Epi: x / Bacteria: x                PHYSICAL EXAM:  General: NAD, resting in bed comfortably.  Cardiac: regular rate, warm and well perfused  Respiratory: Nonlabored respirations, normal cw expansion.  Abdomen: soft, epigastric TTP, nondistended  Extremities: normal strength, FROM, no deformities

## 2024-09-29 LAB
ALBUMIN SERPL ELPH-MCNC: 3.4 G/DL — SIGNIFICANT CHANGE UP (ref 3.3–5)
ALP SERPL-CCNC: 70 U/L — SIGNIFICANT CHANGE UP (ref 40–120)
ALT FLD-CCNC: 22 U/L — SIGNIFICANT CHANGE UP (ref 4–41)
ANION GAP SERPL CALC-SCNC: 11 MMOL/L — SIGNIFICANT CHANGE UP (ref 7–14)
AST SERPL-CCNC: 16 U/L — SIGNIFICANT CHANGE UP (ref 4–40)
BILIRUB DIRECT SERPL-MCNC: <0.2 MG/DL — SIGNIFICANT CHANGE UP (ref 0–0.3)
BILIRUB INDIRECT FLD-MCNC: >0.3 MG/DL — SIGNIFICANT CHANGE UP (ref 0–1)
BILIRUB SERPL-MCNC: 0.5 MG/DL — SIGNIFICANT CHANGE UP (ref 0.2–1.2)
BUN SERPL-MCNC: 6 MG/DL — LOW (ref 7–23)
CALCIUM SERPL-MCNC: 8.6 MG/DL — SIGNIFICANT CHANGE UP (ref 8.4–10.5)
CHLORIDE SERPL-SCNC: 105 MMOL/L — SIGNIFICANT CHANGE UP (ref 98–107)
CO2 SERPL-SCNC: 24 MMOL/L — SIGNIFICANT CHANGE UP (ref 22–31)
CREAT SERPL-MCNC: 0.64 MG/DL — SIGNIFICANT CHANGE UP (ref 0.5–1.3)
EGFR: 116 ML/MIN/1.73M2 — SIGNIFICANT CHANGE UP
GLUCOSE SERPL-MCNC: 74 MG/DL — SIGNIFICANT CHANGE UP (ref 70–99)
HCT VFR BLD CALC: 36.1 % — LOW (ref 39–50)
HGB BLD-MCNC: 12.6 G/DL — LOW (ref 13–17)
LIDOCAIN IGE QN: 100 U/L — HIGH (ref 7–60)
MAGNESIUM SERPL-MCNC: 1.9 MG/DL — SIGNIFICANT CHANGE UP (ref 1.6–2.6)
MCHC RBC-ENTMCNC: 30.4 PG — SIGNIFICANT CHANGE UP (ref 27–34)
MCHC RBC-ENTMCNC: 34.9 GM/DL — SIGNIFICANT CHANGE UP (ref 32–36)
MCV RBC AUTO: 87.2 FL — SIGNIFICANT CHANGE UP (ref 80–100)
NRBC # BLD: 0 /100 WBCS — SIGNIFICANT CHANGE UP (ref 0–0)
NRBC # FLD: 0 K/UL — SIGNIFICANT CHANGE UP (ref 0–0)
PHOSPHATE SERPL-MCNC: 3 MG/DL — SIGNIFICANT CHANGE UP (ref 2.5–4.5)
PLATELET # BLD AUTO: 331 K/UL — SIGNIFICANT CHANGE UP (ref 150–400)
POTASSIUM SERPL-MCNC: 3.6 MMOL/L — SIGNIFICANT CHANGE UP (ref 3.5–5.3)
POTASSIUM SERPL-SCNC: 3.6 MMOL/L — SIGNIFICANT CHANGE UP (ref 3.5–5.3)
PROT SERPL-MCNC: 6.6 G/DL — SIGNIFICANT CHANGE UP (ref 6–8.3)
RBC # BLD: 4.14 M/UL — LOW (ref 4.2–5.8)
RBC # FLD: 12.8 % — SIGNIFICANT CHANGE UP (ref 10.3–14.5)
SODIUM SERPL-SCNC: 140 MMOL/L — SIGNIFICANT CHANGE UP (ref 135–145)
WBC # BLD: 7.53 K/UL — SIGNIFICANT CHANGE UP (ref 3.8–10.5)
WBC # FLD AUTO: 7.53 K/UL — SIGNIFICANT CHANGE UP (ref 3.8–10.5)

## 2024-09-29 PROCEDURE — 76705 ECHO EXAM OF ABDOMEN: CPT | Mod: 26

## 2024-09-29 RX ADMIN — Medication 975 MILLIGRAM(S): at 23:55

## 2024-09-29 RX ADMIN — HYDROMORPHONE HYDROCHLORIDE 0.2 MILLIGRAM(S): 1 INJECTION, SOLUTION INTRAMUSCULAR; INTRAVENOUS; SUBCUTANEOUS at 00:29

## 2024-09-29 RX ADMIN — Medication 40 MILLIEQUIVALENT(S): at 15:05

## 2024-09-29 RX ADMIN — KETOROLAC TROMETHAMINE 30 MILLIGRAM(S): 10 TABLET, FILM COATED ORAL at 11:43

## 2024-09-29 RX ADMIN — Medication 975 MILLIGRAM(S): at 00:07

## 2024-09-29 RX ADMIN — HYDROMORPHONE HYDROCHLORIDE 0.2 MILLIGRAM(S): 1 INJECTION, SOLUTION INTRAMUSCULAR; INTRAVENOUS; SUBCUTANEOUS at 00:00

## 2024-09-29 RX ADMIN — HYDROMORPHONE HYDROCHLORIDE 0.2 MILLIGRAM(S): 1 INJECTION, SOLUTION INTRAMUSCULAR; INTRAVENOUS; SUBCUTANEOUS at 15:22

## 2024-09-29 RX ADMIN — Medication 40 MILLIGRAM(S): at 11:43

## 2024-09-29 RX ADMIN — KETOROLAC TROMETHAMINE 30 MILLIGRAM(S): 10 TABLET, FILM COATED ORAL at 18:15

## 2024-09-29 RX ADMIN — OXYCODONE HYDROCHLORIDE 10 MILLIGRAM(S): 30 TABLET, FILM COATED, EXTENDED RELEASE ORAL at 04:13

## 2024-09-29 RX ADMIN — HYDROMORPHONE HYDROCHLORIDE 0.2 MILLIGRAM(S): 1 INJECTION, SOLUTION INTRAMUSCULAR; INTRAVENOUS; SUBCUTANEOUS at 15:07

## 2024-09-29 RX ADMIN — Medication 20 MILLIGRAM(S): at 11:43

## 2024-09-29 RX ADMIN — Medication 975 MILLIGRAM(S): at 18:15

## 2024-09-29 RX ADMIN — KETOROLAC TROMETHAMINE 30 MILLIGRAM(S): 10 TABLET, FILM COATED ORAL at 11:58

## 2024-09-29 RX ADMIN — OXYCODONE HYDROCHLORIDE 10 MILLIGRAM(S): 30 TABLET, FILM COATED, EXTENDED RELEASE ORAL at 11:15

## 2024-09-29 RX ADMIN — HYDROMORPHONE HYDROCHLORIDE 0.2 MILLIGRAM(S): 1 INJECTION, SOLUTION INTRAMUSCULAR; INTRAVENOUS; SUBCUTANEOUS at 05:42

## 2024-09-29 RX ADMIN — Medication 975 MILLIGRAM(S): at 05:42

## 2024-09-29 RX ADMIN — Medication 5 MILLIGRAM(S): at 05:42

## 2024-09-29 RX ADMIN — KETOROLAC TROMETHAMINE 30 MILLIGRAM(S): 10 TABLET, FILM COATED ORAL at 17:38

## 2024-09-29 RX ADMIN — Medication 975 MILLIGRAM(S): at 17:38

## 2024-09-29 RX ADMIN — OXYCODONE HYDROCHLORIDE 10 MILLIGRAM(S): 30 TABLET, FILM COATED, EXTENDED RELEASE ORAL at 10:23

## 2024-09-29 RX ADMIN — Medication 975 MILLIGRAM(S): at 06:00

## 2024-09-29 RX ADMIN — ENOXAPARIN SODIUM 40 MILLIGRAM(S): 150 INJECTION SUBCUTANEOUS at 05:43

## 2024-09-29 RX ADMIN — Medication 975 MILLIGRAM(S): at 01:24

## 2024-09-29 RX ADMIN — Medication 975 MILLIGRAM(S): at 12:07

## 2024-09-29 RX ADMIN — KETOROLAC TROMETHAMINE 30 MILLIGRAM(S): 10 TABLET, FILM COATED ORAL at 03:24

## 2024-09-29 RX ADMIN — HYDROMORPHONE HYDROCHLORIDE 0.2 MILLIGRAM(S): 1 INJECTION, SOLUTION INTRAMUSCULAR; INTRAVENOUS; SUBCUTANEOUS at 22:18

## 2024-09-29 RX ADMIN — KETOROLAC TROMETHAMINE 30 MILLIGRAM(S): 10 TABLET, FILM COATED ORAL at 23:56

## 2024-09-29 RX ADMIN — KETOROLAC TROMETHAMINE 30 MILLIGRAM(S): 10 TABLET, FILM COATED ORAL at 00:07

## 2024-09-29 RX ADMIN — KETOROLAC TROMETHAMINE 30 MILLIGRAM(S): 10 TABLET, FILM COATED ORAL at 05:42

## 2024-09-29 RX ADMIN — OXYCODONE HYDROCHLORIDE 10 MILLIGRAM(S): 30 TABLET, FILM COATED, EXTENDED RELEASE ORAL at 20:56

## 2024-09-29 RX ADMIN — Medication 975 MILLIGRAM(S): at 11:42

## 2024-09-29 NOTE — PROGRESS NOTE ADULT - SUBJECTIVE AND OBJECTIVE BOX
SUBJECTIVE: Pt seen at bedside this morning. Pain controlled with PO & IV pain medications. Tolerating diet. Patient passing gas and having bowel movements. Denies chest pain, shortness of breath, nausea, vomiting, abdominal pain.     Physical Exam:  General Appearance: Appears well, NAD  Neck: Supple  Chest: Equal expansion bilaterally, equal breath sounds  CV: Pulse regular presently  Abdomen: Soft, mild epigastric TTP, nondistended   Extremities: Grossly symmetric, SCD's in place     Vital Signs Last 24 Hrs  T(C): 36.7 (29 Sep 2024 09:00), Max: 36.8 (28 Sep 2024 22:00)  T(F): 98.1 (29 Sep 2024 09:00), Max: 98.3 (29 Sep 2024 06:00)  HR: 63 (29 Sep 2024 09:00) (59 - 68)  BP: 128/71 (29 Sep 2024 09:00) (123/77 - 133/76)  BP(mean): --  RR: 17 (29 Sep 2024 09:00) (17 - 18)  SpO2: 100% (29 Sep 2024 09:00) (100% - 100%)    Parameters below as of 29 Sep 2024 09:00  Patient On (Oxygen Delivery Method): room air        I&O's Summary    28 Sep 2024 07:01  -  29 Sep 2024 07:00  --------------------------------------------------------  IN: 2900 mL / OUT: 2300 mL / NET: 600 mL    29 Sep 2024 07:01  -  29 Sep 2024 14:13  --------------------------------------------------------  IN: 250 mL / OUT: 400 mL / NET: -150 mL          LABS:                        12.6   7.53  )-----------( 331      ( 29 Sep 2024 07:54 )             36.1     09-29    140  |  105  |  6[L]  ----------------------------<  74  3.6   |  24  |  0.64    Ca    8.6      29 Sep 2024 07:54  Phos  3.0     09-29  Mg     1.90     09-29    TPro  6.6  /  Alb  3.4  /  TBili  0.5  /  DBili  <0.2  /  AST  16  /  ALT  22  /  AlkPhos  70  09-29      Urinalysis Basic - ( 29 Sep 2024 07:54 )    Color: x / Appearance: x / SG: x / pH: x  Gluc: 74 mg/dL / Ketone: x  / Bili: x / Urobili: x   Blood: x / Protein: x / Nitrite: x   Leuk Esterase: x / RBC: x / WBC x   Sq Epi: x / Non Sq Epi: x / Bacteria: x        RADIOLOGY & ADDITIONAL STUDIES:

## 2024-09-29 NOTE — CONSULT NOTE ADULT - ASSESSMENT
49M PMHx gastritis, HTN who presents recent admission with  acute pancreatitis now admitted with nec pantreatitis     Acute Pancreatitis. withb necrosis   appreciate surgery care   planned OR         ·  Problem: Hypertension.   ·  Plan: - c/w amlodipine.

## 2024-09-29 NOTE — PROGRESS NOTE ADULT - ASSESSMENT
49 year old male presents with acute interstitial edematous pancreatitis c/f nec panc.    Plan  - RUQ US today w/ no gallstones, pericholecystic edema or palpable tenderness.  - MRCP from 9/27 w/ findings consistent with pancreatitis with probable focal peripancreatic necrosis versus peripancreatic hemorrhage anterior and lateral to the pancreatic head.  - Diet: clear liquids w LPS, ensure, joanne supplements   - IVL  - Pain control PRN  - DVT prophylaxis    B team surgery [id number]

## 2024-09-30 ENCOUNTER — TRANSCRIPTION ENCOUNTER (OUTPATIENT)
Age: 49
End: 2024-09-30

## 2024-09-30 LAB
ALBUMIN SERPL ELPH-MCNC: 3.6 G/DL — SIGNIFICANT CHANGE UP (ref 3.3–5)
ALP SERPL-CCNC: 71 U/L — SIGNIFICANT CHANGE UP (ref 40–120)
ALT FLD-CCNC: 20 U/L — SIGNIFICANT CHANGE UP (ref 4–41)
ANION GAP SERPL CALC-SCNC: 11 MMOL/L — SIGNIFICANT CHANGE UP (ref 7–14)
AST SERPL-CCNC: 13 U/L — SIGNIFICANT CHANGE UP (ref 4–40)
BILIRUB DIRECT SERPL-MCNC: <0.2 MG/DL — SIGNIFICANT CHANGE UP (ref 0–0.3)
BILIRUB INDIRECT FLD-MCNC: >0.1 MG/DL — SIGNIFICANT CHANGE UP (ref 0–1)
BILIRUB SERPL-MCNC: 0.3 MG/DL — SIGNIFICANT CHANGE UP (ref 0.2–1.2)
BUN SERPL-MCNC: 9 MG/DL — SIGNIFICANT CHANGE UP (ref 7–23)
CALCIUM SERPL-MCNC: 9.1 MG/DL — SIGNIFICANT CHANGE UP (ref 8.4–10.5)
CHLORIDE SERPL-SCNC: 105 MMOL/L — SIGNIFICANT CHANGE UP (ref 98–107)
CO2 SERPL-SCNC: 23 MMOL/L — SIGNIFICANT CHANGE UP (ref 22–31)
CREAT SERPL-MCNC: 0.72 MG/DL — SIGNIFICANT CHANGE UP (ref 0.5–1.3)
EGFR: 112 ML/MIN/1.73M2 — SIGNIFICANT CHANGE UP
GLUCOSE SERPL-MCNC: 94 MG/DL — SIGNIFICANT CHANGE UP (ref 70–99)
HCT VFR BLD CALC: 38 % — LOW (ref 39–50)
HGB BLD-MCNC: 13 G/DL — SIGNIFICANT CHANGE UP (ref 13–17)
IGG4 SER-MCNC: 16.8 MG/DL — SIGNIFICANT CHANGE UP (ref 1–123)
LIDOCAIN IGE QN: 70 U/L — HIGH (ref 7–60)
MAGNESIUM SERPL-MCNC: 2.1 MG/DL — SIGNIFICANT CHANGE UP (ref 1.6–2.6)
MCHC RBC-ENTMCNC: 30.2 PG — SIGNIFICANT CHANGE UP (ref 27–34)
MCHC RBC-ENTMCNC: 34.2 GM/DL — SIGNIFICANT CHANGE UP (ref 32–36)
MCV RBC AUTO: 88.2 FL — SIGNIFICANT CHANGE UP (ref 80–100)
NRBC # BLD: 0 /100 WBCS — SIGNIFICANT CHANGE UP (ref 0–0)
NRBC # FLD: 0 K/UL — SIGNIFICANT CHANGE UP (ref 0–0)
PHOSPHATE SERPL-MCNC: 3.3 MG/DL — SIGNIFICANT CHANGE UP (ref 2.5–4.5)
PLATELET # BLD AUTO: 350 K/UL — SIGNIFICANT CHANGE UP (ref 150–400)
POTASSIUM SERPL-MCNC: 3.7 MMOL/L — SIGNIFICANT CHANGE UP (ref 3.5–5.3)
POTASSIUM SERPL-SCNC: 3.7 MMOL/L — SIGNIFICANT CHANGE UP (ref 3.5–5.3)
PROT SERPL-MCNC: 6.8 G/DL — SIGNIFICANT CHANGE UP (ref 6–8.3)
RBC # BLD: 4.31 M/UL — SIGNIFICANT CHANGE UP (ref 4.2–5.8)
RBC # FLD: 13.1 % — SIGNIFICANT CHANGE UP (ref 10.3–14.5)
SODIUM SERPL-SCNC: 139 MMOL/L — SIGNIFICANT CHANGE UP (ref 135–145)
WBC # BLD: 6.81 K/UL — SIGNIFICANT CHANGE UP (ref 3.8–10.5)
WBC # FLD AUTO: 6.81 K/UL — SIGNIFICANT CHANGE UP (ref 3.8–10.5)

## 2024-09-30 PROCEDURE — 99232 SBSQ HOSP IP/OBS MODERATE 35: CPT | Mod: GC,57

## 2024-09-30 RX ORDER — SODIUM CHLORIDE IRRIG SOLUTION 0.9 %
1000 SOLUTION, IRRIGATION IRRIGATION
Refills: 0 | Status: DISCONTINUED | OUTPATIENT
Start: 2024-10-01 | End: 2024-10-01

## 2024-09-30 RX ADMIN — OXYCODONE HYDROCHLORIDE 10 MILLIGRAM(S): 30 TABLET, FILM COATED, EXTENDED RELEASE ORAL at 00:48

## 2024-09-30 RX ADMIN — Medication 40 MILLIEQUIVALENT(S): at 11:19

## 2024-09-30 RX ADMIN — KETOROLAC TROMETHAMINE 30 MILLIGRAM(S): 10 TABLET, FILM COATED ORAL at 06:16

## 2024-09-30 RX ADMIN — KETOROLAC TROMETHAMINE 30 MILLIGRAM(S): 10 TABLET, FILM COATED ORAL at 11:13

## 2024-09-30 RX ADMIN — Medication 5 MILLIGRAM(S): at 06:15

## 2024-09-30 RX ADMIN — KETOROLAC TROMETHAMINE 30 MILLIGRAM(S): 10 TABLET, FILM COATED ORAL at 11:45

## 2024-09-30 RX ADMIN — KETOROLAC TROMETHAMINE 30 MILLIGRAM(S): 10 TABLET, FILM COATED ORAL at 17:21

## 2024-09-30 RX ADMIN — OXYCODONE HYDROCHLORIDE 10 MILLIGRAM(S): 30 TABLET, FILM COATED, EXTENDED RELEASE ORAL at 06:16

## 2024-09-30 RX ADMIN — Medication 20 MILLIGRAM(S): at 11:13

## 2024-09-30 RX ADMIN — Medication 40 MILLIGRAM(S): at 11:13

## 2024-09-30 RX ADMIN — ENOXAPARIN SODIUM 40 MILLIGRAM(S): 150 INJECTION SUBCUTANEOUS at 06:16

## 2024-09-30 RX ADMIN — Medication 975 MILLIGRAM(S): at 06:16

## 2024-09-30 RX ADMIN — OXYCODONE HYDROCHLORIDE 10 MILLIGRAM(S): 30 TABLET, FILM COATED, EXTENDED RELEASE ORAL at 16:38

## 2024-09-30 RX ADMIN — KETOROLAC TROMETHAMINE 30 MILLIGRAM(S): 10 TABLET, FILM COATED ORAL at 17:45

## 2024-09-30 RX ADMIN — OXYCODONE HYDROCHLORIDE 10 MILLIGRAM(S): 30 TABLET, FILM COATED, EXTENDED RELEASE ORAL at 17:18

## 2024-09-30 RX ADMIN — KETOROLAC TROMETHAMINE 30 MILLIGRAM(S): 10 TABLET, FILM COATED ORAL at 00:48

## 2024-09-30 RX ADMIN — Medication 975 MILLIGRAM(S): at 23:30

## 2024-09-30 RX ADMIN — HYDROMORPHONE HYDROCHLORIDE 0.2 MILLIGRAM(S): 1 INJECTION, SOLUTION INTRAMUSCULAR; INTRAVENOUS; SUBCUTANEOUS at 00:48

## 2024-09-30 RX ADMIN — Medication 975 MILLIGRAM(S): at 11:13

## 2024-09-30 RX ADMIN — Medication 975 MILLIGRAM(S): at 18:16

## 2024-09-30 RX ADMIN — Medication 975 MILLIGRAM(S): at 00:48

## 2024-09-30 RX ADMIN — KETOROLAC TROMETHAMINE 30 MILLIGRAM(S): 10 TABLET, FILM COATED ORAL at 23:30

## 2024-09-30 RX ADMIN — Medication 975 MILLIGRAM(S): at 12:00

## 2024-09-30 RX ADMIN — Medication 975 MILLIGRAM(S): at 17:21

## 2024-09-30 NOTE — PROGRESS NOTE ADULT - ASSESSMENT
49M PMHx gastritis, HTN who presents recent admission with  acute pancreatitis now admitted with nec pantreatitis     Acute Pancreatitis. withb necrosis   appreciate surgery care   planned OR [id number]       ·  Problem: Hypertension.   ·  Plan: - c/w amlodipine.

## 2024-09-30 NOTE — PROGRESS NOTE ADULT - SUBJECTIVE AND OBJECTIVE BOX
Date of service: 09-30-24 @ 19:05      Patient is a 49y old  Male who presents with a chief complaint of necrotizing pancreatitis (30 Sep 2024 08:34)                                                               INTERVAL HPI/OVERNIGHT EVENTS:    REVIEW OF SYSTEMS:     CONSTITUTIONAL: No weakness, fevers or chills  RESPIRATORY: No cough, wheezing,  No shortness of breath  CARDIOVASCULAR: No chest pain or palpitations  GASTROINTESTINAL:  abdominal pain  . No nausea, vomiting, or hematemesis; No diarrhea or constipation. No melena or hematochezia.  GENITOURINARY: No dysuria, frequency or hematuria  NEUROLOGICAL: No numbness or weakness  SKIN: No itching, burning, rashes, or lesions                                                                                                                                                                                                                                                                                 Medications:  MEDICATIONS  (STANDING):  acetaminophen     Tablet .. 975 milliGRAM(s) Oral every 6 hours  amLODIPine   Tablet 5 milliGRAM(s) Oral daily  enoxaparin Injectable 40 milliGRAM(s) SubCutaneous every 24 hours  escitalopram 20 milliGRAM(s) Oral daily  famotidine    Tablet 40 milliGRAM(s) Oral daily  influenza   Vaccine 0.5 milliLiter(s) IntraMuscular once  ketorolac   Injectable 30 milliGRAM(s) IV Push every 6 hours    MEDICATIONS  (PRN):  HYDROmorphone  Injectable 0.2 milliGRAM(s) IV Push every 4 hours PRN breakthrough pain only  oxyCODONE    IR 5 milliGRAM(s) Oral every 6 hours PRN Moderate Pain (4 - 6)  oxyCODONE    IR 10 milliGRAM(s) Oral every 6 hours PRN Severe Pain (7 - 10)       Allergies    No Known Allergies    Intolerances      Vital Signs Last 24 Hrs  T(C): 36.8 (30 Sep 2024 18:08), Max: 36.8 (30 Sep 2024 13:21)  T(F): 98.2 (30 Sep 2024 18:08), Max: 98.2 (30 Sep 2024 13:21)  HR: 58 (30 Sep 2024 18:08) (58 - 71)  BP: 126/83 (30 Sep 2024 18:08) (114/72 - 145/89)  BP(mean): --  RR: 18 (30 Sep 2024 18:08) (18 - 18)  SpO2: 98% (30 Sep 2024 18:08) (98% - 100%)    Parameters below as of 30 Sep 2024 18:08  Patient On (Oxygen Delivery Method): room air      CAPILLARY BLOOD GLUCOSE          09-29 @ 07:01 - 09-30 @ 07:00  --------------------------------------------------------  IN: 790 mL / OUT: 1200 mL / NET: -410 mL    09-30 @ 07:01 - 09-30 @ 19:05  --------------------------------------------------------  IN: 0 mL / OUT: 1000 mL / NET: -1000 mL      Physical Exam:    Daily     Daily   General:  Well appearing, NAD, not cachetic  HEENT:  Nonicteric, PERRLA  CV:  RRR, S1S2   Lungs:  CTA B/L, no wheezes, rales, rhonchi  Abdomen:  Soft, non-tender, no distended, positive BS  Extremities:  2+ pulses, no c/c, no edema  Skin:  Warm and dry, no rashes  :  No temple  Neuro:  AAOx3, non-focal, grossly intact                                                                                                                                                                                                                                                                                                LABS:                               13.0   6.81  )-----------( 350      ( 30 Sep 2024 06:45 )             38.0                      09-30    139  |  105  |  9   ----------------------------<  94  3.7   |  23  |  0.72    Ca    9.1      30 Sep 2024 06:45  Phos  3.3     09-30  Mg     2.10     09-30    TPro  6.8  /  Alb  3.6  /  TBili  0.3  /  DBili  <0.2  /  AST  13  /  ALT  20  /  AlkPhos  71  09-30                       RADIOLOGY & ADDITIONAL TESTS         I personally reviewed: [  ]EKG   [  ]CXR    [  ] CT      A/P:         Discussed with :     [facility] consultants' Notes   Time spent :

## 2024-09-30 NOTE — PROGRESS NOTE ADULT - ATTENDING COMMENTS
Patient with necrotizing pancreatitis likely from passed stone. Patient pancreatitis significantly improved.  plan  npo ivf  plan for or tomorrow    I have personally interviewed and examined this patient, reviewed pertinent labs and imaging, and discussed the case with colleagues, residents, and physician assistants on B Team rounds.    The active care issues are:  1. necrotizing pancreatitis    [facility] (B Team) [facility]    urgent issues - spectra [id number]  nonurgent issues - [phone]  patient appointments or afterhours - [phone]

## 2024-09-30 NOTE — PROGRESS NOTE ADULT - SUBJECTIVE AND OBJECTIVE BOX
B Team Surgery Daily Progress Note  =====================================================    INTERVAL EVENTS: NAEO. IgG4 sent for possible AIP, negative (14.7).     SUBJECTIVE: Patient seen at bedside during AM rounds. Resting comfortably, pain appropriately controlled. Denies fever, chills, N/V, chest pain, SOB.    - MRCP from 9/27 w/ findings consistent with pancreatitis with probable focal peripancreatic necrosis versus peripancreatic hemorrhage anterior and lateral to the pancreatic head    --------------------------------------------------------------------------------------  VITAL SIGNS:  T(C): 36.7 (09-30-24 @ 06:00), Max: 36.8 (09-29-24 @ 14:00)  HR: 60 (09-30-24 @ 06:00) (58 - 71)  BP: 145/89 (09-30-24 @ 06:00) (122/76 - 145/89)  RR: 18 (09-30-24 @ 06:00) (17 - 18)  SpO2: 99% (09-30-24 @ 06:00) (98% - 100%)  --------------------------------------------------------------------------------------    EXAM    General: NAD, resting in bed comfortably  Respiratory: Normal work of breathing   Abdomen: Soft, mild epigastric TTP, nondistended   Extremities: Warm, well perfused    --------------------------------------------------------------------------------------

## 2024-10-01 ENCOUNTER — RESULT REVIEW (OUTPATIENT)
Age: 49
End: 2024-10-01

## 2024-10-01 LAB
ALBUMIN SERPL ELPH-MCNC: 3.6 G/DL — SIGNIFICANT CHANGE UP (ref 3.3–5)
ALP SERPL-CCNC: 69 U/L — SIGNIFICANT CHANGE UP (ref 40–120)
ALT FLD-CCNC: 21 U/L — SIGNIFICANT CHANGE UP (ref 4–41)
ANION GAP SERPL CALC-SCNC: 15 MMOL/L — HIGH (ref 7–14)
APTT BLD: 31.3 SEC — SIGNIFICANT CHANGE UP (ref 24.5–35.6)
AST SERPL-CCNC: 21 U/L — SIGNIFICANT CHANGE UP (ref 4–40)
BILIRUB DIRECT SERPL-MCNC: <0.2 MG/DL — SIGNIFICANT CHANGE UP (ref 0–0.3)
BILIRUB INDIRECT FLD-MCNC: >0 MG/DL — SIGNIFICANT CHANGE UP (ref 0–1)
BILIRUB SERPL-MCNC: 0.2 MG/DL — SIGNIFICANT CHANGE UP (ref 0.2–1.2)
BLD GP AB SCN SERPL QL: NEGATIVE — SIGNIFICANT CHANGE UP
BUN SERPL-MCNC: 12 MG/DL — SIGNIFICANT CHANGE UP (ref 7–23)
CALCIUM SERPL-MCNC: 8.9 MG/DL — SIGNIFICANT CHANGE UP (ref 8.4–10.5)
CHLORIDE SERPL-SCNC: 105 MMOL/L — SIGNIFICANT CHANGE UP (ref 98–107)
CO2 SERPL-SCNC: 22 MMOL/L — SIGNIFICANT CHANGE UP (ref 22–31)
CREAT SERPL-MCNC: 0.76 MG/DL — SIGNIFICANT CHANGE UP (ref 0.5–1.3)
EGFR: 110 ML/MIN/1.73M2 — SIGNIFICANT CHANGE UP
GLUCOSE SERPL-MCNC: 92 MG/DL — SIGNIFICANT CHANGE UP (ref 70–99)
HCT VFR BLD CALC: 37.6 % — LOW (ref 39–50)
HGB BLD-MCNC: 12.8 G/DL — LOW (ref 13–17)
INR BLD: 1.2 RATIO — HIGH (ref 0.85–1.16)
LIDOCAIN IGE QN: 76 U/L — HIGH (ref 7–60)
MAGNESIUM SERPL-MCNC: 2 MG/DL — SIGNIFICANT CHANGE UP (ref 1.6–2.6)
MCHC RBC-ENTMCNC: 30.1 PG — SIGNIFICANT CHANGE UP (ref 27–34)
MCHC RBC-ENTMCNC: 34 GM/DL — SIGNIFICANT CHANGE UP (ref 32–36)
MCV RBC AUTO: 88.5 FL — SIGNIFICANT CHANGE UP (ref 80–100)
NRBC # BLD: 0 /100 WBCS — SIGNIFICANT CHANGE UP (ref 0–0)
NRBC # FLD: 0 K/UL — SIGNIFICANT CHANGE UP (ref 0–0)
PHOSPHATE SERPL-MCNC: 3 MG/DL — SIGNIFICANT CHANGE UP (ref 2.5–4.5)
PLATELET # BLD AUTO: 324 K/UL — SIGNIFICANT CHANGE UP (ref 150–400)
POTASSIUM SERPL-MCNC: 3.9 MMOL/L — SIGNIFICANT CHANGE UP (ref 3.5–5.3)
POTASSIUM SERPL-SCNC: 3.9 MMOL/L — SIGNIFICANT CHANGE UP (ref 3.5–5.3)
PROT SERPL-MCNC: 6.4 G/DL — SIGNIFICANT CHANGE UP (ref 6–8.3)
PROTHROM AB SERPL-ACNC: 14.2 SEC — HIGH (ref 9.9–13.4)
RBC # BLD: 4.25 M/UL — SIGNIFICANT CHANGE UP (ref 4.2–5.8)
RBC # FLD: 13.1 % — SIGNIFICANT CHANGE UP (ref 10.3–14.5)
RH IG SCN BLD-IMP: POSITIVE — SIGNIFICANT CHANGE UP
SODIUM SERPL-SCNC: 142 MMOL/L — SIGNIFICANT CHANGE UP (ref 135–145)
WBC # BLD: 7.52 K/UL — SIGNIFICANT CHANGE UP (ref 3.8–10.5)
WBC # FLD AUTO: 7.52 K/UL — SIGNIFICANT CHANGE UP (ref 3.8–10.5)

## 2024-10-01 PROCEDURE — 47562 LAPAROSCOPIC CHOLECYSTECTOMY: CPT | Mod: GC

## 2024-10-01 PROCEDURE — 88304 TISSUE EXAM BY PATHOLOGIST: CPT | Mod: 26

## 2024-10-01 DEVICE — LIGATING CLIPS WECK HEMOLOK POLYMER MEDIUM-LARGE (GREEN) 6: Type: IMPLANTABLE DEVICE | Status: FUNCTIONAL

## 2024-10-01 RX ORDER — OXYCODONE HYDROCHLORIDE 30 MG/1
5 TABLET, FILM COATED, EXTENDED RELEASE ORAL ONCE
Refills: 0 | Status: DISCONTINUED | OUTPATIENT
Start: 2024-10-01 | End: 2024-10-01

## 2024-10-01 RX ORDER — ONDANSETRON HCL/PF 4 MG/2 ML
4 VIAL (ML) INJECTION ONCE
Refills: 0 | Status: DISCONTINUED | OUTPATIENT
Start: 2024-10-01 | End: 2024-10-01

## 2024-10-01 RX ORDER — HYDROMORPHONE HYDROCHLORIDE 1 MG/ML
1 INJECTION, SOLUTION INTRAMUSCULAR; INTRAVENOUS; SUBCUTANEOUS
Refills: 0 | Status: DISCONTINUED | OUTPATIENT
Start: 2024-10-01 | End: 2024-10-01

## 2024-10-01 RX ORDER — HYDROMORPHONE HYDROCHLORIDE 1 MG/ML
0.5 INJECTION, SOLUTION INTRAMUSCULAR; INTRAVENOUS; SUBCUTANEOUS
Refills: 0 | Status: DISCONTINUED | OUTPATIENT
Start: 2024-10-01 | End: 2024-10-01

## 2024-10-01 RX ADMIN — Medication 975 MILLIGRAM(S): at 06:40

## 2024-10-01 RX ADMIN — HYDROMORPHONE HYDROCHLORIDE 0.5 MILLIGRAM(S): 1 INJECTION, SOLUTION INTRAMUSCULAR; INTRAVENOUS; SUBCUTANEOUS at 15:26

## 2024-10-01 RX ADMIN — Medication 40 MILLIGRAM(S): at 17:22

## 2024-10-01 RX ADMIN — KETOROLAC TROMETHAMINE 30 MILLIGRAM(S): 10 TABLET, FILM COATED ORAL at 23:16

## 2024-10-01 RX ADMIN — OXYCODONE HYDROCHLORIDE 10 MILLIGRAM(S): 30 TABLET, FILM COATED, EXTENDED RELEASE ORAL at 18:43

## 2024-10-01 RX ADMIN — ENOXAPARIN SODIUM 40 MILLIGRAM(S): 150 INJECTION SUBCUTANEOUS at 06:06

## 2024-10-01 RX ADMIN — Medication 975 MILLIGRAM(S): at 00:00

## 2024-10-01 RX ADMIN — Medication 20 MILLIGRAM(S): at 17:22

## 2024-10-01 RX ADMIN — KETOROLAC TROMETHAMINE 30 MILLIGRAM(S): 10 TABLET, FILM COATED ORAL at 06:06

## 2024-10-01 RX ADMIN — KETOROLAC TROMETHAMINE 30 MILLIGRAM(S): 10 TABLET, FILM COATED ORAL at 17:52

## 2024-10-01 RX ADMIN — OXYCODONE HYDROCHLORIDE 10 MILLIGRAM(S): 30 TABLET, FILM COATED, EXTENDED RELEASE ORAL at 19:13

## 2024-10-01 RX ADMIN — KETOROLAC TROMETHAMINE 30 MILLIGRAM(S): 10 TABLET, FILM COATED ORAL at 06:40

## 2024-10-01 RX ADMIN — KETOROLAC TROMETHAMINE 30 MILLIGRAM(S): 10 TABLET, FILM COATED ORAL at 17:22

## 2024-10-01 RX ADMIN — Medication 975 MILLIGRAM(S): at 06:06

## 2024-10-01 RX ADMIN — KETOROLAC TROMETHAMINE 30 MILLIGRAM(S): 10 TABLET, FILM COATED ORAL at 00:00

## 2024-10-01 RX ADMIN — Medication 5 MILLIGRAM(S): at 06:06

## 2024-10-01 RX ADMIN — HYDROMORPHONE HYDROCHLORIDE 0.5 MILLIGRAM(S): 1 INJECTION, SOLUTION INTRAMUSCULAR; INTRAVENOUS; SUBCUTANEOUS at 15:47

## 2024-10-01 RX ADMIN — Medication 975 MILLIGRAM(S): at 19:32

## 2024-10-01 NOTE — BRIEF OPERATIVE NOTE - OPERATION/FINDINGS
Robotic cholecystectomy. 4 port. Critical view obtained. Cystic duct and artery clipped and divided. Gallbladder dissected off of liver. Hemostasis achieved. No complications.

## 2024-10-01 NOTE — CHART NOTE - NSCHARTNOTEFT_GEN_A_CORE
SURGERY POST OP CHECK    STATUS POST PROCEDURE: Robotic cholecystectomy    SUBJECTIVE: Pt seen and examined w c/o minimal abdomina discomfort.  Denies CP/SOB/N/V.   Attempting clears  Ambulating well      OBJECTIVE:  Vital Signs Last 24 Hrs  T(C): 36.6 (01 Oct 2024 17:25), Max: 36.8 (30 Sep 2024 22:03)  T(F): 97.9 (01 Oct 2024 17:25), Max: 98.3 (01 Oct 2024 15:00)  HR: 66 (01 Oct 2024 17:25) (58 - 73)  BP: 139/89 (01 Oct 2024 17:25) (125/83 - 144/71)  BP(mean): 87 (01 Oct 2024 16:15) (84 - 92)  RR: 18 (01 Oct 2024 17:25) (9 - 18)  SpO2: 98% (01 Oct 2024 17:25) (94% - 100%)    Parameters below as of 01 Oct 2024 17:25  Patient On (Oxygen Delivery Method): room air      I&O's Summary    30 Sep 2024 07:01  -  01 Oct 2024 07:00  --------------------------------------------------------  IN: 1020 mL / OUT: 1700 mL / NET: -680 mL    01 Oct 2024 07:01  -  01 Oct 2024 18:45  --------------------------------------------------------  IN: 0 mL / OUT: 700 mL / NET: -700 mL        PHYSICAL EXAM:  Gen: NAD, A&Ox3  Pulm: No respiratory distress, no subcostal retractions  CV: RRR, no JVD  Abd: Soft, NT, ND, port sites c/d/i    Extremities: FROM, warm and well perfused, equal bilateral muscle strength    ASSESSMENT/PLAN: HPI:  49M with recent admission for pancreatitis (presumed gallstone) presenting with increasing abdominal pain for last 4 days. He reports the pain was controlled when discharged last week but progressively worsened over last few days. Reports associated nausea, vomiting and bloating. He denies fevers or chills. On last admission, patient began feeling better and MRCP negative for stones.    . Pt is s/p ; POD #0 Robotic cholecystectomy. Appearing well. Stating that he wants to stay overnight for pain control. HDS.     - Diet  - Monitor bowel function   - Analgesia and antiemetics as needed  - Strict I&O's  - OOB as tolerated  - Incentive spirometry  - DVT prophylaxis: SCD  - Monitor overnight  - Dispo: Discharge home in AM

## 2024-10-01 NOTE — PROGRESS NOTE ADULT - ASSESSMENT
49 year old male presents with acute interstitial edematous pancreatitis c/f nec panc.    Plan  - Diet: NPO p MN for OR today  - IVF: LR @ 125  - RUQ US negative >> f/u GI c/s >> recommended GI f/u outpt, no need for inpatient workup  - Strict I's O's  - POP check  - Pain control PRN  - DVT prophylaxis: lovenox    B team surgery  [id number]

## 2024-10-01 NOTE — PROGRESS NOTE ADULT - SUBJECTIVE AND OBJECTIVE BOX
B Team General Surgery Progress Note    S: Pt seen and examined with team on morning rounds. Patient feels well. Admits to minimal discomfort L side abdomen. No nausea/emesis. NPO for OR today for cholecystectomy. + voiding. No CP/Palpitations/SOB/HA/Dizziness.      Vital Signs Last 24 Hrs  T(C): 36.7 (01 Oct 2024 06:05), Max: 36.8 (30 Sep 2024 13:21)  T(F): 98.1 (01 Oct 2024 06:05), Max: 98.2 (30 Sep 2024 13:21)  HR: 66 (01 Oct 2024 06:05) (58 - 67)  BP: 141/86 (01 Oct 2024 06:05) (114/72 - 141/86)  BP(mean): --  RR: 17 (01 Oct 2024 06:05) (17 - 18)  SpO2: 100% (01 Oct 2024 06:05) (98% - 100%)    Parameters below as of 01 Oct 2024 06:05  Patient On (Oxygen Delivery Method): room air        I&O's Summary    30 Sep 2024 07:01  -  01 Oct 2024 07:00  --------------------------------------------------------  IN: 1020 mL / OUT: 1700 mL / NET: -680 mL      I&O's Detail    30 Sep 2024 07:01  -  01 Oct 2024 07:00  --------------------------------------------------------  IN:    Lactated Ringers: 750 mL    Oral Fluid: 270 mL  Total IN: 1020 mL    OUT:    Voided (mL): 1700 mL  Total OUT: 1700 mL    Total NET: -680 mL          General Appearance: Appears well, NAD  Chest: Breathing comfortably on RA.    CV: S1, S2 @ 66  Abdomen: Soft, nondistended, +minimal tenderness L abdomen. No epigastric or RUQ tenderness. No rebound or guarding.  Extremities: Moves all extremitis.    LABS:                        12.8   7.52  )-----------( 324      ( 01 Oct 2024 03:41 )             37.6     10-01    142  |  105  |  12  ----------------------------<  92  3.9   |  22  |  0.76    Ca    8.9      01 Oct 2024 03:41  Phos  3.0     10-01  Mg     2.00     10-01    TPro  6.4  /  Alb  3.6  /  TBili  0.2  /  DBili  <0.2  /  AST  21  /  ALT  21  /  AlkPhos  69  10-01    PT/INR - ( 01 Oct 2024 03:41 )   PT: 14.2 sec;   INR: 1.20 ratio         PTT - ( 01 Oct 2024 03:41 )  PTT:31.3 sec  Urinalysis Basic - ( 01 Oct 2024 03:41 )    Color: x / Appearance: x / SG: x / pH: x  Gluc: 92 mg/dL / Ketone: x  / Bili: x / Urobili: x   Blood: x / Protein: x / Nitrite: x   Leuk Esterase: x / RBC: x / WBC x   Sq Epi: x / Non Sq Epi: x / Bacteria: x

## 2024-10-02 ENCOUNTER — TRANSCRIPTION ENCOUNTER (OUTPATIENT)
Age: 49
End: 2024-10-02

## 2024-10-02 VITALS
DIASTOLIC BLOOD PRESSURE: 71 MMHG | HEART RATE: 67 BPM | TEMPERATURE: 98 F | OXYGEN SATURATION: 98 % | SYSTOLIC BLOOD PRESSURE: 128 MMHG | RESPIRATION RATE: 18 BRPM

## 2024-10-02 LAB
ALBUMIN SERPL ELPH-MCNC: 3.7 G/DL — SIGNIFICANT CHANGE UP (ref 3.3–5)
ALP SERPL-CCNC: 72 U/L — SIGNIFICANT CHANGE UP (ref 40–120)
ALT FLD-CCNC: 50 U/L — HIGH (ref 4–41)
ANION GAP SERPL CALC-SCNC: 13 MMOL/L — SIGNIFICANT CHANGE UP (ref 7–14)
AST SERPL-CCNC: 54 U/L — HIGH (ref 4–40)
BILIRUB DIRECT SERPL-MCNC: <0.2 MG/DL — SIGNIFICANT CHANGE UP (ref 0–0.3)
BILIRUB INDIRECT FLD-MCNC: >0.1 MG/DL — SIGNIFICANT CHANGE UP (ref 0–1)
BILIRUB SERPL-MCNC: 0.3 MG/DL — SIGNIFICANT CHANGE UP (ref 0.2–1.2)
BUN SERPL-MCNC: 12 MG/DL — SIGNIFICANT CHANGE UP (ref 7–23)
CALCIUM SERPL-MCNC: 9.2 MG/DL — SIGNIFICANT CHANGE UP (ref 8.4–10.5)
CHLORIDE SERPL-SCNC: 104 MMOL/L — SIGNIFICANT CHANGE UP (ref 98–107)
CO2 SERPL-SCNC: 24 MMOL/L — SIGNIFICANT CHANGE UP (ref 22–31)
CREAT SERPL-MCNC: 0.75 MG/DL — SIGNIFICANT CHANGE UP (ref 0.5–1.3)
EGFR: 111 ML/MIN/1.73M2 — SIGNIFICANT CHANGE UP
GLUCOSE SERPL-MCNC: 101 MG/DL — HIGH (ref 70–99)
HCT VFR BLD CALC: 37.4 % — LOW (ref 39–50)
HGB BLD-MCNC: 12.6 G/DL — LOW (ref 13–17)
LIDOCAIN IGE QN: 42 U/L — SIGNIFICANT CHANGE UP (ref 7–60)
MAGNESIUM SERPL-MCNC: 2 MG/DL — SIGNIFICANT CHANGE UP (ref 1.6–2.6)
MCHC RBC-ENTMCNC: 29.8 PG — SIGNIFICANT CHANGE UP (ref 27–34)
MCHC RBC-ENTMCNC: 33.7 GM/DL — SIGNIFICANT CHANGE UP (ref 32–36)
MCV RBC AUTO: 88.4 FL — SIGNIFICANT CHANGE UP (ref 80–100)
NRBC # BLD: 0 /100 WBCS — SIGNIFICANT CHANGE UP (ref 0–0)
NRBC # FLD: 0 K/UL — SIGNIFICANT CHANGE UP (ref 0–0)
PHOSPHATE SERPL-MCNC: 2.9 MG/DL — SIGNIFICANT CHANGE UP (ref 2.5–4.5)
PLATELET # BLD AUTO: 337 K/UL — SIGNIFICANT CHANGE UP (ref 150–400)
POTASSIUM SERPL-MCNC: 4.9 MMOL/L — SIGNIFICANT CHANGE UP (ref 3.5–5.3)
POTASSIUM SERPL-SCNC: 4.9 MMOL/L — SIGNIFICANT CHANGE UP (ref 3.5–5.3)
PROT SERPL-MCNC: 6.8 G/DL — SIGNIFICANT CHANGE UP (ref 6–8.3)
RBC # BLD: 4.23 M/UL — SIGNIFICANT CHANGE UP (ref 4.2–5.8)
RBC # FLD: 13 % — SIGNIFICANT CHANGE UP (ref 10.3–14.5)
SODIUM SERPL-SCNC: 141 MMOL/L — SIGNIFICANT CHANGE UP (ref 135–145)
WBC # BLD: 10.64 K/UL — HIGH (ref 3.8–10.5)
WBC # FLD AUTO: 10.64 K/UL — HIGH (ref 3.8–10.5)

## 2024-10-02 RX ORDER — ESCITALOPRAM OXALATE 10 MG/1
1 TABLET ORAL
Refills: 0 | DISCHARGE

## 2024-10-02 RX ORDER — OXYCODONE HYDROCHLORIDE 30 MG/1
1 TABLET, FILM COATED, EXTENDED RELEASE ORAL
Qty: 10 | Refills: 0
Start: 2024-10-02

## 2024-10-02 RX ORDER — ESCITALOPRAM OXALATE 10 MG
1 TABLET ORAL
Qty: 0 | Refills: 0 | DISCHARGE
Start: 2024-10-02

## 2024-10-02 RX ADMIN — KETOROLAC TROMETHAMINE 30 MILLIGRAM(S): 10 TABLET, FILM COATED ORAL at 11:44

## 2024-10-02 RX ADMIN — OXYCODONE HYDROCHLORIDE 10 MILLIGRAM(S): 30 TABLET, FILM COATED, EXTENDED RELEASE ORAL at 02:04

## 2024-10-02 RX ADMIN — ENOXAPARIN SODIUM 40 MILLIGRAM(S): 150 INJECTION SUBCUTANEOUS at 05:37

## 2024-10-02 RX ADMIN — Medication 975 MILLIGRAM(S): at 07:20

## 2024-10-02 RX ADMIN — Medication 975 MILLIGRAM(S): at 01:32

## 2024-10-02 RX ADMIN — Medication 5 MILLIGRAM(S): at 05:37

## 2024-10-02 RX ADMIN — KETOROLAC TROMETHAMINE 30 MILLIGRAM(S): 10 TABLET, FILM COATED ORAL at 05:37

## 2024-10-02 RX ADMIN — Medication 40 MILLIGRAM(S): at 11:14

## 2024-10-02 RX ADMIN — Medication 20 MILLIGRAM(S): at 11:14

## 2024-10-02 RX ADMIN — KETOROLAC TROMETHAMINE 30 MILLIGRAM(S): 10 TABLET, FILM COATED ORAL at 11:14

## 2024-10-02 RX ADMIN — OXYCODONE HYDROCHLORIDE 10 MILLIGRAM(S): 30 TABLET, FILM COATED, EXTENDED RELEASE ORAL at 01:34

## 2024-10-02 NOTE — PROGRESS NOTE ADULT - ASSESSMENT
49M with recent admission for pancreatitis (presumed gallstone) presenting with increasing abdominal pain for last 4 days. He reports the pain was controlled when discharged last week but progressively worsened over last few days. Reports associated nausea, vomiting and bloating. He denies fevers or chills. On last admission, patient began feeling better and MRCP negative for stones.  Pt is POD #1 robotic cholecystectomy. Appearing well. Stating that he wants to stay overnight for pain control. HDS.     - Pain controlled overnight  - Regular Diet  - Monitor bowel function   - Analgesia and antiemetics as needed  - Strict I&O's  - OOB as tolerated  - Incentive spirometry  - DVT prophylaxis: SCD  - Monitor overnight  - Dispo: Discharge home    B Team Surgery  #[id number]

## 2024-10-02 NOTE — DISCHARGE NOTE PROVIDER - CARE PROVIDER_API CALL
Tello Malone [facility]  Surgery  [id number] [place] [id number]  Phone: [phone]  Fax: [phone]  Established Patient  Follow Up Time: 1 week

## 2024-10-02 NOTE — PROGRESS NOTE ADULT - REASON FOR ADMISSION
necrotizing pancreatitis

## 2024-10-02 NOTE — DISCHARGE NOTE PROVIDER - NSDCMRMEDTOKEN_GEN_ALL_CORE_FT
acetaminophen 325 mg oral tablet: 2 tab(s) orally every 6 hours As needed Mild Pain (1 - 3)  aluminum hydroxide-magnesium hydroxide 200 mg-200 mg/5 mL oral suspension: 30 milliliter(s) orally every 4 hours As needed Dyspepsia  amLODIPine 5 mg oral tablet: 1 tab(s) orally once a day  escitalopram 20 mg oral tablet: 1 tab(s) orally once a day  famotidine 40 mg oral tablet: 1 tab(s) orally once a day (at bedtime)  ibuprofen 400 mg oral tablet: 1 tab(s) orally every 6 hours  oxyCODONE 5 mg oral tablet: 1 tab(s) orally every 6 hours as needed for pain MDD: 4  senna leaf extract oral tablet: 2 tab(s) orally once a day (at bedtime)

## 2024-10-02 NOTE — DISCHARGE NOTE NURSING/CASE MANAGEMENT/SOCIAL WORK - NSDCPEFALRISK_GEN_ALL_CORE
For information on Fall & Injury Prevention, visit: [email or web address] OR  [email or web address] OR  https://www.cdc.gov/steadi/patient.html

## 2024-10-02 NOTE — DISCHARGE NOTE NURSING/CASE MANAGEMENT/SOCIAL WORK - PATIENT PORTAL LINK FT
You can access the FollowMyHealth Patient Portal offered by [facility] by registering at the following website: [email or web address]. By joining [facility]’s FollowMyHealth portal, you will also be able to view your health information using other applications (apps) compatible with our system.

## 2024-10-02 NOTE — DISCHARGE NOTE NURSING/CASE MANAGEMENT/SOCIAL WORK - NSDCVIVACCINE_GEN_ALL_CORE_FT
Tdap; 07-Sep-2019 03:06; Trupti Burdick (RN); [employer]; [id number] (Exp. Date: 07-Sep-2019); IntraMuscular; Deltoid Left.; 0.5 milliLiter(s); VIS (VIS Published: 09-May-2013, VIS Presented: 07-Sep-2019);

## 2024-10-02 NOTE — PROGRESS NOTE ADULT - SUBJECTIVE AND OBJECTIVE BOX
SUBJECTIVE: Pt seen at bedside this morning. Pain controlled with PO pain medications. Tolerating diet. Patient passing gas. No BM yet. Denies chest pain, shortness of breath, nausea, vomiting, abdominal pain.     Physical Exam:  General Appearance: Appears well, NAD  Neck: Supple  Chest: Equal expansion bilaterally, equal breath sounds  CV: Pulse regular presently  Abdomen: Soft, appropriately TTP, port sites c/d/i.  Extremities: Grossly symmetric, SCD's in place     Vital Signs Last 24 Hrs  T(C): 36.7 (02 Oct 2024 05:37), Max: 36.9 (02 Oct 2024 01:30)  T(F): 98.1 (02 Oct 2024 05:37), Max: 98.4 (02 Oct 2024 01:30)  HR: 63 (02 Oct 2024 05:37) (61 - 73)  BP: 144/90 (02 Oct 2024 05:37) (126/71 - 150/80)  BP(mean): 87 (01 Oct 2024 16:15) (84 - 92)  RR: 17 (02 Oct 2024 05:37) (9 - 18)  SpO2: 99% (02 Oct 2024 05:37) (94% - 100%)    Parameters below as of 02 Oct 2024 05:37  Patient On (Oxygen Delivery Method): room air        I&O's Summary    01 Oct 2024 07:01  -  02 Oct 2024 07:00  --------------------------------------------------------  IN: 720 mL / OUT: 1300 mL / NET: -580 mL          LABS:                        12.6   10.64 )-----------( 337      ( 02 Oct 2024 06:30 )             37.4     10-02    141  |  104  |  12  ----------------------------<  101[H]  4.9   |  24  |  0.75    Ca    9.2      02 Oct 2024 06:30  Phos  2.9     10-02  Mg     2.00     10-02    TPro  6.8  /  Alb  3.7  /  TBili  0.3  /  DBili  <0.2  /  AST  54[H]  /  ALT  50[H]  /  AlkPhos  72  10-02    PT/INR - ( 01 Oct 2024 03:41 )   PT: 14.2 sec;   INR: 1.20 ratio         PTT - ( 01 Oct 2024 03:41 )  PTT:31.3 sec  Urinalysis Basic - ( 02 Oct 2024 06:30 )    Color: x / Appearance: x / SG: x / pH: x  Gluc: 101 mg/dL / Ketone: x  / Bili: x / Urobili: x   Blood: x / Protein: x / Nitrite: x   Leuk Esterase: x / RBC: x / WBC x   Sq Epi: x / Non Sq Epi: x / Bacteria: x        RADIOLOGY & ADDITIONAL STUDIES:

## 2024-10-02 NOTE — DISCHARGE NOTE PROVIDER - NSDCFUADDINST_GEN_ALL_CORE_FT
WOUND CARE:  Please keep incisions clean and dry. Please do not Scrub or rub incisions. Do not use lotion or powder on incisions.   BATHING: Please do not submerge wound underwater. You may shower and/or sponge bathe.  ACTIVITY: No heavy lifting or straining. Otherwise, you may return to your usual level of physical activity. If you are taking narcotic pain medication (such as Percocet) DO NOT drive a car, operate machinery or make important decisions.  DIET: Return to your usual diet.  NOTIFY YOUR SURGEON IF: You have any bleeding that does not stop, any pus draining from your wound(s), any fever (over 100.4 F) or chills, persistent nausea/vomiting, persistent diarrhea, or if your pain is not controlled on your discharge pain medications. You can take oxycodone as needed every 6 hours, and alternate between acetaminophen/Tylenol and ibuprofen/Motrin so that one is taken every 3 hours as needed for pain.  FOLLOW-UP: Please follow up with your primary care physician in one week regarding your hospitalization. Please follow-up with your surgeon, within 7 days following discharge- please call to schedule an appointment.

## 2024-10-02 NOTE — PROGRESS NOTE ADULT - SUBJECTIVE AND OBJECTIVE BOX
ANESTHESIA POSTOP CHECK    49y Male POSTOP DAY 1     No COMPLAINTS    NO APPARENT ANESTHESIA COMPLICATIONS

## 2024-10-02 NOTE — PROGRESS NOTE ADULT - ASSESSMENT
49M PMHx gastritis, HTN who presents recent admission with  acute pancreatitis now admitted with nec pantreatitis     Acute Pancreatitis. withb necrosis   appreciate surgery care   s/p robotic choelcystectomy         ·  Problem: Hypertension.   ·  Plan: - c/w amlodipine.

## 2024-10-02 NOTE — PROGRESS NOTE ADULT - SUBJECTIVE AND OBJECTIVE BOX
Date of service: 10-02-24 @ 10:31      Patient is a 49y old  Male who presents with a chief complaint of necrotizing pancreatitis (02 Oct 2024 10:06)                                                               INTERVAL HPI/OVERNIGHT EVENTS:    REVIEW OF SYSTEMS:   no N/v  had passed gas                                                                                                                                                                                                                                                                      Medications:  MEDICATIONS  (STANDING):  acetaminophen     Tablet .. 975 milliGRAM(s) Oral every 6 hours  amLODIPine   Tablet 5 milliGRAM(s) Oral daily  enoxaparin Injectable 40 milliGRAM(s) SubCutaneous every 24 hours  escitalopram 20 milliGRAM(s) Oral daily  famotidine    Tablet 40 milliGRAM(s) Oral daily  influenza   Vaccine 0.5 milliLiter(s) IntraMuscular once  ketorolac   Injectable 30 milliGRAM(s) IV Push every 6 hours    MEDICATIONS  (PRN):  HYDROmorphone  Injectable 0.2 milliGRAM(s) IV Push every 4 hours PRN breakthrough pain only  oxyCODONE    IR 10 milliGRAM(s) Oral every 6 hours PRN Severe Pain (7 - 10)  oxyCODONE    IR 5 milliGRAM(s) Oral every 6 hours PRN Moderate Pain (4 - 6)       Allergies    No Known Allergies    Intolerances      Vital Signs Last 24 Hrs  T(C): 36.9 (02 Oct 2024 10:00), Max: 36.9 (02 Oct 2024 01:30)  T(F): 98.4 (02 Oct 2024 10:00), Max: 98.4 (02 Oct 2024 01:30)  HR: 67 (02 Oct 2024 10:00) (61 - 73)  BP: 128/71 (02 Oct 2024 10:00) (126/71 - 150/80)  BP(mean): 87 (01 Oct 2024 16:15) (84 - 92)  RR: 18 (02 Oct 2024 10:00) (9 - 18)  SpO2: 98% (02 Oct 2024 10:00) (94% - 100%)    Parameters below as of 02 Oct 2024 10:00  Patient On (Oxygen Delivery Method): room air      CAPILLARY BLOOD GLUCOSE          10-01 @ 07:01  -  10-02 @ 07:00  --------------------------------------------------------  IN: 720 mL / OUT: 1300 mL / NET: -580 mL      Physical Exam:    Daily Height in cm: 182.88 (01 Oct 2024 10:47)    Daily   General:  Well appearing, NAD, not cachetic  HEENT:  Nonicteric, PERRLA  CV:  RRR, S1S2   Lungs:  CTA B/L, no wheezes, rales, rhonchi  Abdomen:  Soft  Extremities:  2+ pulses, no c/c, no edema  Skin:  Warm and dry, no rashes  :  No temple  Neuro:  AAOx3, non-focal, grossly intact                                                                                                                                                                                                                                                                                                LABS:                               12.6   10.64 )-----------( 337      ( 02 Oct 2024 06:30 )             37.4                      10-02    141  |  104  |  12  ----------------------------<  101[H]  4.9   |  24  |  0.75    Ca    9.2      02 Oct 2024 06:30  Phos  2.9     10-02  Mg     2.00     10-02    TPro  6.8  /  Alb  3.7  /  TBili  0.3  /  DBili  <0.2  /  AST  54[H]  /  ALT  50[H]  /  AlkPhos  72  10-02                       RADIOLOGY & ADDITIONAL TESTS         I personally reviewed: [  ]EKG   [  ]CXR    [  ] CT      A/P:         Discussed with :     [facility] consultants' Notes   Time spent :

## 2024-10-02 NOTE — DISCHARGE NOTE PROVIDER - HOSPITAL COURSE
FARZANA SNOW is a 49y Male who was admitted to [facility] for increasing abdominal pain for 4 days in the setting of a recent admission for pancreatitis. On initial workup, the patient had an elevated lipase suggestive of recurrent pancreatitis, and a gallstone seen in the pancreatic duct at the level of ampulla on CT imaging.     Upon admission to the ED, the patient was hemodynamically stable.    The patient was admitted to the Surgery service for further management of gallstone pancreatitis. While admitted, his pain was managed with IV and oral pain medications, and was restarted on his home medications. The patient had MRCP imaging performed, which confirmed pancreatitis but no parenchymal necrosis and likely passage of the stone previously seen. He then underwent a robot-assisted cholecystectomy on 10/1/2024, which was uneventful with successful removal of the gallbladder. The patient remained stable in the recovery unit and returned to the floor.     At time of discharge on post operative day 1, pt was tolerating a regular diet, voiding/stooling independently, ambulating, and pain was well-controlled. Patient felt ready for discharge. Patient should follow up with his surgeon within a week and call to make an appointment and should follow up with his primary care provider regarding his recent admission.

## 2024-10-12 LAB — SURGICAL PATHOLOGY STUDY: SIGNIFICANT CHANGE UP

## 2024-10-25 ENCOUNTER — APPOINTMENT (OUTPATIENT)
Dept: SURGERY | Facility: HOSPITAL | Age: 49
End: 2024-10-25

## 2024-10-25 PROBLEM — Z00.00 ENCOUNTER FOR PREVENTIVE HEALTH EXAMINATION: Status: ACTIVE | Noted: 2024-10-25

## 2025-05-12 NOTE — CONSULT NOTE ADULT - SUBJECTIVE AND OBJECTIVE BOX
HPI:  49M with recent admission for pancreatitis (presumed gallstone) presenting with increasing abdominal pain for last 4 days. He reports the pain was controlled when discharged last week but progressively worsened over last few days. Reports associated nausea, vomiting and bloating. He denies fevers or chills. On last admission, patient began feeling better and MRCP negative for stones.  (26 Sep 2024 14:30)      REVIEW OF SYSTEMS:    CONSTITUTIONAL: No weakness, fevers or chills  EYES/ENT: No visual changes;  No vertigo or throat pain   NECK: No pain or stiffness  RESPIRATORY: No cough, wheezing, hemoptysis; No shortness of breath  CARDIOVASCULAR: No chest pain or palpitations  GASTROINTESTINAL: No abdominal or epigastric pain. No nausea, vomiting, or hematemesis; No diarrhea or constipation. No melena or hematochezia.  GENITOURINARY: No dysuria, frequency or hematuria  NEUROLOGICAL: No numbness or weakness  SKIN: No itching, burning, rashes, or lesions   All other review of systems is negative unless indicated above.      Medications:   MEDICATIONS  (STANDING):  acetaminophen     Tablet .. 975 milliGRAM(s) Oral every 6 hours  amLODIPine   Tablet 5 milliGRAM(s) Oral daily  enoxaparin Injectable 40 milliGRAM(s) SubCutaneous every 24 hours  escitalopram 20 milliGRAM(s) Oral daily  famotidine    Tablet 40 milliGRAM(s) Oral daily  influenza   Vaccine 0.5 milliLiter(s) IntraMuscular once  ketorolac   Injectable 30 milliGRAM(s) IV Push every 6 hours    MEDICATIONS  (PRN):  HYDROmorphone  Injectable 0.2 milliGRAM(s) IV Push every 4 hours PRN breakthrough pain only  oxyCODONE    IR 5 milliGRAM(s) Oral every 6 hours PRN Moderate Pain (4 - 6)  oxyCODONE    IR 10 milliGRAM(s) Oral every 6 hours PRN Severe Pain (7 - 10)      Allergies    No Known Allergies    Intolerances        PAST MEDICAL & SURGICAL HISTORY:  Gastritis      Hypertension          Social :    No smoking       No ETOH use            Vital Signs Last 24 Hrs  T(C): 36.7 (29 Sep 2024 22:10), Max: 36.8 (29 Sep 2024 06:00)  T(F): 98.1 (29 Sep 2024 22:10), Max: 98.3 (29 Sep 2024 06:00)  HR: 58 (29 Sep 2024 22:10) (58 - 63)  BP: 134/79 (29 Sep 2024 22:10) (122/76 - 134/79)  BP(mean): --  RR: 18 (29 Sep 2024 22:10) (17 - 18)  SpO2: 98% (29 Sep 2024 22:10) (98% - 100%)    Parameters below as of 29 Sep 2024 22:10  Patient On (Oxygen Delivery Method): room air      CAPILLARY BLOOD GLUCOSE          09-28 @ 07:01 - 09-29 @ 07:00  --------------------------------------------------------  IN: 2900 mL / OUT: 2300 mL / NET: 600 mL    09-29 @ 07:01 - 09-29 @ 23:10  --------------------------------------------------------  IN: 350 mL / OUT: 400 mL / NET: -50 mL        Physical Exam:    Daily     Daily   General:  Well appearing, NAD, not cachetic  HEENT:  Nonicteric, PERRLA  CV:  RRR, no murmur, no JVD  Lungs:  CTA B/L, no wheezes, rales, rhonchi  Abdomen:  Soft, non-tender, no distended, positive BS, no hepatosplenomegaly  Extremities:  2+ pulses, no c/c, no edema  Skin:  Warm and dry, no rashes  :  No temple  Neuro:  AAOx3, non-focal, CN II-XII grossly intact  No Restraints    LABS:                        12.6   7.53  )-----------( 331      ( 29 Sep 2024 07:54 )             36.1     09-29    140  |  105  |  6[L]  ----------------------------<  74  3.6   |  24  |  0.64    Ca    8.6      29 Sep 2024 07:54  Phos  3.0     09-29  Mg     1.90     09-29    TPro  6.6  /  Alb  3.4  /  TBili  0.5  /  DBili  <0.2  /  AST  16  /  ALT  22  /  AlkPhos  70  09-29      Urinalysis Basic - ( 29 Sep 2024 07:54 )    Color: x / Appearance: x / SG: x / pH: x  Gluc: 74 mg/dL / Ketone: x  / Bili: x / Urobili: x   Blood: x / Protein: x / Nitrite: x   Leuk Esterase: x / RBC: x / WBC x   Sq Epi: x / Non Sq Epi: x / Bacteria: x              RADIOLOGY & ADDITIONAL TESTS:    ---------------------------------------------------------------------------  I personally reviewed: [  ]EKG   [  ]CXR    [  ] CT    [  ]Other  ---------------------------------------------------------------------------  
Yes

## (undated) DEVICE — ELCTR BOVIE TIP BLADE INSULATED 2.75" EDGE

## (undated) DEVICE — DRAPE TOWEL BLUE 17" X 24"

## (undated) DEVICE — GLV 7 PROTEXIS (WHITE)

## (undated) DEVICE — D HELP - CLEARVIEW CLEARIFY SYSTEM

## (undated) DEVICE — SUT VICRYL 0 27" UR-6

## (undated) DEVICE — XI DRAPE COLUMN

## (undated) DEVICE — SOL IRR POUR H2O 500ML

## (undated) DEVICE — XI ARM SCISSOR MONO CURVED

## (undated) DEVICE — XI ARM FORCEP PROGRASP 8MM

## (undated) DEVICE — TROCAR COVIDIEN VERSAONE FIXATION CANNULA 5MM

## (undated) DEVICE — DISSECTOR ENDOSCOPIC KITTNER SINGLE TIP

## (undated) DEVICE — TROCAR COVIDIEN BLUNT TIP HASSAN 10MM STANDARD

## (undated) DEVICE — PROTECTOR HEEL / ELBOW FLUFFY

## (undated) DEVICE — BLADE SURGICAL #15 CARBON

## (undated) DEVICE — TUBING OLYMPUS INSUFFLATION

## (undated) DEVICE — XI ARM PERMANENT CAUTERY HOOK

## (undated) DEVICE — SOL IRR POUR NS 0.9% 1000ML

## (undated) DEVICE — XI ARM FORCEP FENESTRATED BIPOLAR 8MM

## (undated) DEVICE — XI CORD MONOPOLAR CAUTERY (GREEN)

## (undated) DEVICE — TROCAR COVIDIEN VERSAPORT BLADELESS OPTICAL 5MM STANDARD

## (undated) DEVICE — VENODYNE/SCD SLEEVE CALF MEDIUM

## (undated) DEVICE — TIP METZENBAUM SCISSOR MONOPOLAR ENDOCUT (ORANGE)

## (undated) DEVICE — BASIN SET SINGLE

## (undated) DEVICE — XI ARM CLIP APPLIER MEDIUM-LARGE

## (undated) DEVICE — DRSG STERISTRIPS 0.5 X 4"

## (undated) DEVICE — SUT MONOCRYL 4-0 27" PS-2 UNDYED

## (undated) DEVICE — POSITIONER STRAP ARMBOARD VELCRO TS-30

## (undated) DEVICE — XI SEAL UNIVERSIAL 5-12MM

## (undated) DEVICE — ELCTR BOVIE PENCIL SMOKE EVACUATION

## (undated) DEVICE — ELCTR GROUNDING PAD ADULT COVIDIEN

## (undated) DEVICE — PACK GENERAL LAPAROSCOPY

## (undated) DEVICE — XI DRAPE ARM

## (undated) DEVICE — WARMING BLANKET UPPER ADULT

## (undated) DEVICE — XI CORD BIPOLAR CAUTERY (BLUE)

## (undated) DEVICE — GLV 7.5 PROTEXIS (BLUE)

## (undated) DEVICE — INZII RETRIEVAL SYSTEM 5MM

## (undated) DEVICE — SYR LUER LOK 20CC

## (undated) DEVICE — DRAPE 3/4 SHEET 52X76"

## (undated) DEVICE — XI OBTURATOR OPTICAL BLADELESS 8MM